# Patient Record
Sex: MALE | Race: WHITE | NOT HISPANIC OR LATINO | Employment: FULL TIME | URBAN - METROPOLITAN AREA
[De-identification: names, ages, dates, MRNs, and addresses within clinical notes are randomized per-mention and may not be internally consistent; named-entity substitution may affect disease eponyms.]

---

## 2017-03-30 ENCOUNTER — ALLSCRIPTS OFFICE VISIT (OUTPATIENT)
Dept: OTHER | Facility: OTHER | Age: 57
End: 2017-03-30

## 2017-03-30 DIAGNOSIS — M54.50 LOW BACK PAIN: ICD-10-CM

## 2017-03-30 DIAGNOSIS — E11.9 TYPE 2 DIABETES MELLITUS WITHOUT COMPLICATIONS (HCC): ICD-10-CM

## 2017-03-30 DIAGNOSIS — R20.2 PARESTHESIA OF SKIN: ICD-10-CM

## 2017-03-30 DIAGNOSIS — E78.5 HYPERLIPIDEMIA: ICD-10-CM

## 2017-03-30 DIAGNOSIS — M54.6 PAIN IN THORACIC SPINE: ICD-10-CM

## 2017-03-30 DIAGNOSIS — R20.0 ANESTHESIA OF SKIN: ICD-10-CM

## 2017-03-30 DIAGNOSIS — E78.1 PURE HYPERGLYCERIDEMIA: ICD-10-CM

## 2017-03-30 DIAGNOSIS — M25.50 PAIN IN JOINT: ICD-10-CM

## 2017-03-30 DIAGNOSIS — M54.2 CERVICALGIA: ICD-10-CM

## 2017-03-30 DIAGNOSIS — E55.9 VITAMIN D DEFICIENCY: ICD-10-CM

## 2017-04-04 ENCOUNTER — GENERIC CONVERSION - ENCOUNTER (OUTPATIENT)
Dept: OTHER | Facility: OTHER | Age: 57
End: 2017-04-04

## 2017-04-04 ENCOUNTER — HOSPITAL ENCOUNTER (OUTPATIENT)
Dept: RADIOLOGY | Facility: HOSPITAL | Age: 57
Discharge: HOME | End: 2017-04-04
Payer: COMMERCIAL

## 2017-04-04 ENCOUNTER — APPOINTMENT (OUTPATIENT)
Dept: LAB | Facility: HOSPITAL | Age: 57
End: 2017-04-04
Payer: COMMERCIAL

## 2017-04-04 DIAGNOSIS — M54.50 LOW BACK PAIN: ICD-10-CM

## 2017-04-04 DIAGNOSIS — R20.0 ANESTHESIA OF SKIN: ICD-10-CM

## 2017-04-04 DIAGNOSIS — E78.1 PURE HYPERGLYCERIDEMIA: ICD-10-CM

## 2017-04-04 DIAGNOSIS — E55.9 VITAMIN D DEFICIENCY: ICD-10-CM

## 2017-04-04 DIAGNOSIS — R20.2 PARESTHESIA OF SKIN: ICD-10-CM

## 2017-04-04 DIAGNOSIS — M54.6 PAIN IN THORACIC SPINE: ICD-10-CM

## 2017-04-04 DIAGNOSIS — M25.50 PAIN IN JOINT: ICD-10-CM

## 2017-04-04 DIAGNOSIS — M54.2 CERVICALGIA: ICD-10-CM

## 2017-04-04 LAB
25(OH)D3 SERPL-MCNC: 20.4 NG/ML (ref 30–100)
ALBUMIN SERPL BCP-MCNC: 4.1 G/DL (ref 3.5–5)
ALP SERPL-CCNC: 57 U/L (ref 46–116)
ALT SERPL W P-5'-P-CCNC: 43 U/L (ref 12–78)
ANION GAP SERPL CALCULATED.3IONS-SCNC: 13 MMOL/L (ref 4–13)
AST SERPL W P-5'-P-CCNC: 15 U/L (ref 5–45)
BASOPHILS # BLD AUTO: 0.02 THOUSANDS/ΜL (ref 0–0.1)
BASOPHILS NFR BLD AUTO: 0 % (ref 0–1)
BILIRUB SERPL-MCNC: 0.47 MG/DL (ref 0.2–1)
BUN SERPL-MCNC: 20 MG/DL (ref 5–25)
CALCIUM SERPL-MCNC: 9.4 MG/DL (ref 8.3–10.1)
CHLORIDE SERPL-SCNC: 102 MMOL/L (ref 100–108)
CHOLEST SERPL-MCNC: 262 MG/DL (ref 50–200)
CO2 SERPL-SCNC: 26 MMOL/L (ref 21–32)
CREAT SERPL-MCNC: 1.28 MG/DL (ref 0.6–1.3)
EOSINOPHIL # BLD AUTO: 0.04 THOUSAND/ΜL (ref 0–0.61)
EOSINOPHIL NFR BLD AUTO: 0 % (ref 0–6)
ERYTHROCYTE [DISTWIDTH] IN BLOOD BY AUTOMATED COUNT: 11.7 % (ref 11.6–15.1)
ERYTHROCYTE [SEDIMENTATION RATE] IN BLOOD: 5 MM/HOUR (ref 0–10)
EST. AVERAGE GLUCOSE BLD GHB EST-MCNC: 143 MG/DL
GFR SERPL CREATININE-BSD FRML MDRD: 58.6 ML/MIN/1.73SQ M
GLUCOSE P FAST SERPL-MCNC: 123 MG/DL (ref 65–99)
HBA1C MFR BLD: 6.6 % (ref 4.2–6.3)
HCT VFR BLD AUTO: 45.7 % (ref 36.5–49.3)
HDLC SERPL-MCNC: 45 MG/DL (ref 40–60)
HGB BLD-MCNC: 16.3 G/DL (ref 12–17)
LDLC SERPL CALC-MCNC: 171 MG/DL (ref 0–100)
LYMPHOCYTES # BLD AUTO: 2.36 THOUSANDS/ΜL (ref 0.6–4.47)
LYMPHOCYTES NFR BLD AUTO: 21 % (ref 14–44)
MCH RBC QN AUTO: 31 PG (ref 26.8–34.3)
MCHC RBC AUTO-ENTMCNC: 35.7 G/DL (ref 31.4–37.4)
MCV RBC AUTO: 87 FL (ref 82–98)
MONOCYTES # BLD AUTO: 0.69 THOUSAND/ΜL (ref 0.17–1.22)
MONOCYTES NFR BLD AUTO: 6 % (ref 4–12)
NEUTROPHILS # BLD AUTO: 8.21 THOUSANDS/ΜL (ref 1.85–7.62)
NEUTS SEG NFR BLD AUTO: 73 % (ref 43–75)
PLATELET # BLD AUTO: 316 THOUSANDS/UL (ref 149–390)
PMV BLD AUTO: 10.8 FL (ref 8.9–12.7)
POTASSIUM SERPL-SCNC: 4 MMOL/L (ref 3.5–5.3)
PROT SERPL-MCNC: 7.6 G/DL (ref 6.4–8.2)
PSA SERPL-MCNC: 4 NG/ML (ref 0–4)
RBC # BLD AUTO: 5.25 MILLION/UL (ref 3.88–5.62)
SODIUM SERPL-SCNC: 141 MMOL/L (ref 136–145)
T4 FREE SERPL-MCNC: 0.99 NG/DL (ref 0.76–1.46)
TRIGL SERPL-MCNC: 228 MG/DL
TSH SERPL DL<=0.05 MIU/L-ACNC: 1.15 UIU/ML (ref 0.36–3.74)
WBC # BLD AUTO: 11.32 THOUSAND/UL (ref 4.31–10.16)

## 2017-04-04 PROCEDURE — 86430 RHEUMATOID FACTOR TEST QUAL: CPT

## 2017-04-04 PROCEDURE — 85025 COMPLETE CBC W/AUTO DIFF WBC: CPT

## 2017-04-04 PROCEDURE — 72072 X-RAY EXAM THORAC SPINE 3VWS: CPT

## 2017-04-04 PROCEDURE — 86038 ANTINUCLEAR ANTIBODIES: CPT

## 2017-04-04 PROCEDURE — 84439 ASSAY OF FREE THYROXINE: CPT

## 2017-04-04 PROCEDURE — 36415 COLL VENOUS BLD VENIPUNCTURE: CPT

## 2017-04-04 PROCEDURE — 85652 RBC SED RATE AUTOMATED: CPT

## 2017-04-04 PROCEDURE — 84443 ASSAY THYROID STIM HORMONE: CPT

## 2017-04-04 PROCEDURE — 72110 X-RAY EXAM L-2 SPINE 4/>VWS: CPT

## 2017-04-04 PROCEDURE — 86618 LYME DISEASE ANTIBODY: CPT

## 2017-04-04 PROCEDURE — 83036 HEMOGLOBIN GLYCOSYLATED A1C: CPT

## 2017-04-04 PROCEDURE — 82306 VITAMIN D 25 HYDROXY: CPT

## 2017-04-04 PROCEDURE — 72050 X-RAY EXAM NECK SPINE 4/5VWS: CPT

## 2017-04-04 PROCEDURE — 80061 LIPID PANEL: CPT

## 2017-04-04 PROCEDURE — 80053 COMPREHEN METABOLIC PANEL: CPT

## 2017-04-04 PROCEDURE — G0103 PSA SCREENING: HCPCS

## 2017-04-05 ENCOUNTER — GENERIC CONVERSION - ENCOUNTER (OUTPATIENT)
Dept: OTHER | Facility: OTHER | Age: 57
End: 2017-04-05

## 2017-04-05 LAB
B BURGDOR IGG SER IA-ACNC: 0.04
B BURGDOR IGM SER IA-ACNC: 0.2
RHEUMATOID FACT SER QL LA: NEGATIVE
RYE IGE QN: NEGATIVE

## 2017-04-12 ENCOUNTER — HOSPITAL ENCOUNTER (OUTPATIENT)
Dept: NON INVASIVE DIAGNOSTICS | Facility: CLINIC | Age: 57
Discharge: HOME | End: 2017-04-12
Payer: COMMERCIAL

## 2017-04-12 DIAGNOSIS — R20.0 ANESTHESIA OF SKIN: ICD-10-CM

## 2017-04-12 DIAGNOSIS — R20.2 PARESTHESIA OF SKIN: ICD-10-CM

## 2017-04-12 PROCEDURE — 93923 UPR/LXTR ART STDY 3+ LVLS: CPT

## 2017-04-17 ENCOUNTER — ALLSCRIPTS OFFICE VISIT (OUTPATIENT)
Dept: OTHER | Facility: OTHER | Age: 57
End: 2017-04-17

## 2017-04-19 ENCOUNTER — GENERIC CONVERSION - ENCOUNTER (OUTPATIENT)
Dept: OTHER | Facility: OTHER | Age: 57
End: 2017-04-19

## 2017-07-10 ENCOUNTER — GENERIC CONVERSION - ENCOUNTER (OUTPATIENT)
Dept: OTHER | Facility: OTHER | Age: 57
End: 2017-07-10

## 2017-07-10 ENCOUNTER — APPOINTMENT (OUTPATIENT)
Dept: LAB | Facility: HOSPITAL | Age: 57
End: 2017-07-10
Payer: COMMERCIAL

## 2017-07-10 DIAGNOSIS — M25.50 PAIN IN JOINT: ICD-10-CM

## 2017-07-10 DIAGNOSIS — E11.9 TYPE 2 DIABETES MELLITUS WITHOUT COMPLICATIONS (HCC): ICD-10-CM

## 2017-07-10 DIAGNOSIS — E55.9 VITAMIN D DEFICIENCY: ICD-10-CM

## 2017-07-10 DIAGNOSIS — R20.0 ANESTHESIA OF SKIN: ICD-10-CM

## 2017-07-10 DIAGNOSIS — E78.5 HYPERLIPIDEMIA: ICD-10-CM

## 2017-07-10 DIAGNOSIS — R20.2 PARESTHESIA OF SKIN: ICD-10-CM

## 2017-07-10 LAB
ALBUMIN SERPL BCP-MCNC: 4.2 G/DL (ref 3.5–5)
ALP SERPL-CCNC: 48 U/L (ref 46–116)
ALT SERPL W P-5'-P-CCNC: 28 U/L (ref 12–78)
ANION GAP SERPL CALCULATED.3IONS-SCNC: 7 MMOL/L (ref 4–13)
AST SERPL W P-5'-P-CCNC: 17 U/L (ref 5–45)
BILIRUB SERPL-MCNC: 0.31 MG/DL (ref 0.2–1)
BUN SERPL-MCNC: 22 MG/DL (ref 5–25)
CALCIUM SERPL-MCNC: 9.4 MG/DL (ref 8.3–10.1)
CHLORIDE SERPL-SCNC: 104 MMOL/L (ref 100–108)
CO2 SERPL-SCNC: 28 MMOL/L (ref 21–32)
CREAT SERPL-MCNC: 1.23 MG/DL (ref 0.6–1.3)
CREAT UR-MCNC: 90.9 MG/DL
EST. AVERAGE GLUCOSE BLD GHB EST-MCNC: 123 MG/DL
GFR SERPL CREATININE-BSD FRML MDRD: >60 ML/MIN/1.73SQ M
GLUCOSE P FAST SERPL-MCNC: 114 MG/DL (ref 65–99)
HBA1C MFR BLD: 5.9 % (ref 4.2–6.3)
MICROALBUMIN UR-MCNC: 5.5 MG/L (ref 0–20)
MICROALBUMIN/CREAT 24H UR: 6 MG/G CREATININE (ref 0–30)
POTASSIUM SERPL-SCNC: 4.7 MMOL/L (ref 3.5–5.3)
PROT SERPL-MCNC: 7.3 G/DL (ref 6.4–8.2)
SODIUM SERPL-SCNC: 139 MMOL/L (ref 136–145)

## 2017-07-10 PROCEDURE — 82570 ASSAY OF URINE CREATININE: CPT

## 2017-07-10 PROCEDURE — 83036 HEMOGLOBIN GLYCOSYLATED A1C: CPT

## 2017-07-10 PROCEDURE — 36415 COLL VENOUS BLD VENIPUNCTURE: CPT

## 2017-07-10 PROCEDURE — 82043 UR ALBUMIN QUANTITATIVE: CPT

## 2017-07-10 PROCEDURE — 80053 COMPREHEN METABOLIC PANEL: CPT

## 2017-07-17 ENCOUNTER — ALLSCRIPTS OFFICE VISIT (OUTPATIENT)
Dept: OTHER | Facility: OTHER | Age: 57
End: 2017-07-17

## 2017-07-17 DIAGNOSIS — K21.9 GASTRO-ESOPHAGEAL REFLUX DISEASE WITHOUT ESOPHAGITIS: ICD-10-CM

## 2017-07-17 DIAGNOSIS — E78.5 HYPERLIPIDEMIA: ICD-10-CM

## 2017-07-17 DIAGNOSIS — M54.50 LOW BACK PAIN: ICD-10-CM

## 2017-07-17 DIAGNOSIS — E55.9 VITAMIN D DEFICIENCY: ICD-10-CM

## 2017-07-17 DIAGNOSIS — E11.9 TYPE 2 DIABETES MELLITUS WITHOUT COMPLICATIONS (HCC): ICD-10-CM

## 2017-07-17 DIAGNOSIS — M54.2 CERVICALGIA: ICD-10-CM

## 2017-07-25 ENCOUNTER — GENERIC CONVERSION - ENCOUNTER (OUTPATIENT)
Dept: OTHER | Facility: OTHER | Age: 57
End: 2017-07-25

## 2017-07-25 ENCOUNTER — APPOINTMENT (OUTPATIENT)
Dept: PHYSICAL THERAPY | Facility: MEDICAL CENTER | Age: 57
End: 2017-07-25
Payer: COMMERCIAL

## 2017-07-25 DIAGNOSIS — M54.2 CERVICALGIA: ICD-10-CM

## 2017-07-25 DIAGNOSIS — M54.50 LOW BACK PAIN: ICD-10-CM

## 2017-07-25 PROCEDURE — G8990 OTHER PT/OT CURRENT STATUS: HCPCS | Performed by: PHYSICAL THERAPIST

## 2017-07-25 PROCEDURE — 97163 PT EVAL HIGH COMPLEX 45 MIN: CPT

## 2017-07-25 PROCEDURE — 97112 NEUROMUSCULAR REEDUCATION: CPT

## 2017-07-25 PROCEDURE — G8991 OTHER PT/OT GOAL STATUS: HCPCS | Performed by: PHYSICAL THERAPIST

## 2017-08-01 ENCOUNTER — APPOINTMENT (OUTPATIENT)
Dept: PHYSICAL THERAPY | Facility: MEDICAL CENTER | Age: 57
End: 2017-08-01
Payer: COMMERCIAL

## 2017-08-01 PROCEDURE — 97140 MANUAL THERAPY 1/> REGIONS: CPT

## 2017-08-01 PROCEDURE — 97112 NEUROMUSCULAR REEDUCATION: CPT

## 2017-08-01 PROCEDURE — 97110 THERAPEUTIC EXERCISES: CPT

## 2017-08-03 ENCOUNTER — APPOINTMENT (OUTPATIENT)
Dept: PHYSICAL THERAPY | Facility: MEDICAL CENTER | Age: 57
End: 2017-08-03
Payer: COMMERCIAL

## 2017-08-03 PROCEDURE — 97110 THERAPEUTIC EXERCISES: CPT

## 2017-08-03 PROCEDURE — 97140 MANUAL THERAPY 1/> REGIONS: CPT

## 2017-08-08 ENCOUNTER — APPOINTMENT (OUTPATIENT)
Dept: PHYSICAL THERAPY | Facility: MEDICAL CENTER | Age: 57
End: 2017-08-08
Payer: COMMERCIAL

## 2017-08-10 ENCOUNTER — APPOINTMENT (OUTPATIENT)
Dept: PHYSICAL THERAPY | Facility: MEDICAL CENTER | Age: 57
End: 2017-08-10
Payer: COMMERCIAL

## 2017-08-14 ENCOUNTER — GENERIC CONVERSION - ENCOUNTER (OUTPATIENT)
Dept: OTHER | Facility: OTHER | Age: 57
End: 2017-08-14

## 2017-08-14 LAB
LEFT EYE DIABETIC RETINOPATHY: NORMAL
RIGHT EYE DIABETIC RETINOPATHY: NORMAL

## 2017-08-15 ENCOUNTER — APPOINTMENT (OUTPATIENT)
Dept: PHYSICAL THERAPY | Facility: MEDICAL CENTER | Age: 57
End: 2017-08-15
Payer: COMMERCIAL

## 2017-08-28 ENCOUNTER — APPOINTMENT (OUTPATIENT)
Dept: PHYSICAL THERAPY | Facility: MEDICAL CENTER | Age: 57
End: 2017-08-28
Payer: COMMERCIAL

## 2017-10-16 ENCOUNTER — APPOINTMENT (OUTPATIENT)
Dept: LAB | Facility: HOSPITAL | Age: 57
End: 2017-10-16
Payer: COMMERCIAL

## 2017-10-16 DIAGNOSIS — E11.9 TYPE 2 DIABETES MELLITUS WITHOUT COMPLICATIONS (HCC): ICD-10-CM

## 2017-10-16 DIAGNOSIS — E55.9 VITAMIN D DEFICIENCY: ICD-10-CM

## 2017-10-16 DIAGNOSIS — E78.5 HYPERLIPIDEMIA: ICD-10-CM

## 2017-10-16 DIAGNOSIS — K21.9 GASTRO-ESOPHAGEAL REFLUX DISEASE WITHOUT ESOPHAGITIS: ICD-10-CM

## 2017-10-16 DIAGNOSIS — M54.50 LOW BACK PAIN: ICD-10-CM

## 2017-10-16 DIAGNOSIS — M54.2 CERVICALGIA: ICD-10-CM

## 2017-10-16 LAB
25(OH)D3 SERPL-MCNC: 24.3 NG/ML (ref 30–100)
ALBUMIN SERPL BCP-MCNC: 3.8 G/DL (ref 3.5–5)
ALP SERPL-CCNC: 47 U/L (ref 46–116)
ALT SERPL W P-5'-P-CCNC: 27 U/L (ref 12–78)
ANION GAP SERPL CALCULATED.3IONS-SCNC: 7 MMOL/L (ref 4–13)
AST SERPL W P-5'-P-CCNC: 14 U/L (ref 5–45)
BILIRUB SERPL-MCNC: 0.27 MG/DL (ref 0.2–1)
BUN SERPL-MCNC: 16 MG/DL (ref 5–25)
CALCIUM SERPL-MCNC: 9.5 MG/DL (ref 8.3–10.1)
CHLORIDE SERPL-SCNC: 104 MMOL/L (ref 100–108)
CHOLEST SERPL-MCNC: 210 MG/DL (ref 50–200)
CO2 SERPL-SCNC: 28 MMOL/L (ref 21–32)
CREAT SERPL-MCNC: 1.15 MG/DL (ref 0.6–1.3)
EST. AVERAGE GLUCOSE BLD GHB EST-MCNC: 128 MG/DL
GFR SERPL CREATININE-BSD FRML MDRD: 71 ML/MIN/1.73SQ M
GLUCOSE P FAST SERPL-MCNC: 120 MG/DL (ref 65–99)
HBA1C MFR BLD: 6.1 % (ref 4.2–6.3)
HDLC SERPL-MCNC: 39 MG/DL (ref 40–60)
LDLC SERPL CALC-MCNC: 123 MG/DL (ref 0–100)
POTASSIUM SERPL-SCNC: 5.1 MMOL/L (ref 3.5–5.3)
PROT SERPL-MCNC: 6.8 G/DL (ref 6.4–8.2)
SODIUM SERPL-SCNC: 139 MMOL/L (ref 136–145)
TRIGL SERPL-MCNC: 241 MG/DL

## 2017-10-16 PROCEDURE — 82306 VITAMIN D 25 HYDROXY: CPT

## 2017-10-16 PROCEDURE — 80053 COMPREHEN METABOLIC PANEL: CPT

## 2017-10-16 PROCEDURE — 36415 COLL VENOUS BLD VENIPUNCTURE: CPT

## 2017-10-16 PROCEDURE — 80061 LIPID PANEL: CPT

## 2017-10-16 PROCEDURE — 83036 HEMOGLOBIN GLYCOSYLATED A1C: CPT

## 2017-10-23 ENCOUNTER — ALLSCRIPTS OFFICE VISIT (OUTPATIENT)
Dept: OTHER | Facility: OTHER | Age: 57
End: 2017-10-23

## 2017-10-23 DIAGNOSIS — E55.9 VITAMIN D DEFICIENCY: ICD-10-CM

## 2017-10-23 DIAGNOSIS — K21.9 GASTRO-ESOPHAGEAL REFLUX DISEASE WITHOUT ESOPHAGITIS: ICD-10-CM

## 2017-10-23 DIAGNOSIS — E78.5 HYPERLIPIDEMIA: ICD-10-CM

## 2017-10-23 DIAGNOSIS — E11.9 TYPE 2 DIABETES MELLITUS WITHOUT COMPLICATIONS (HCC): ICD-10-CM

## 2017-10-24 NOTE — PROGRESS NOTES
Assessment  1  Hyperlipidemia (272 4) (E78 5)   2  Diabetes mellitus type II, controlled (250 00) (E11 9)   3  Vitamin D deficiency (268 9) (E55 9)   4  Esophageal reflux (530 81) (K21 9)    Plan  Diabetes mellitus type II, controlled, Esophageal reflux, Hyperlipidemia, Vitamin D  deficiency    · (1) COMPREHENSIVE METABOLIC PANEL; Status:Active; Requested VIB:[id number];    · (1) HEMOGLOBIN A1C; Status:Active; Requested AMR:[id number];    · (1) VITAMIN D 25-HYDROXY; Status:Active; Requested FBJ:[id number];    · Follow-up visit in 3 months Evaluation and Treatment  Follow-up  Status: Hold For -  Scheduling  Requested for: [id number]    Discussion/Summary    Here for f-up and review labs  Trial off Metformin since HGA1C at 5 9 in past but is increasing to 6 1 and is on a good low sugar diet and will recheck in 3 months with HGA1C and cmp and Vitamin D labs  Takes Omeprazole 20 mg daily prn gerd elevate head of bed and then use prn omeprazole  Call if gerd continues and will consult GI  Low sugar and low cholesterol diet and exercise encouraged  Take Vitamin D as directed  Rec  cholesterol med if LDL not better  Here with wife  Refuses flu shot  Recheck 3 months with labs  The patient was counseled regarding  The treatment plan was reviewed with the patient/guardian  The patient/guardian understands and agrees with the treatment plan      Chief Complaint  Pt here for a 3 month f/u for diabetes and hyperlipidemia  No other concerns  Patient is here today for follow up of chronic conditions described in HPI  History of Present Illness  Pt here for a 3 month f/u for diabetes and hyperlipidemia  No other concerns  Has Ramonia Leisure which is stable on omeprazole  He is here with wife  He is not compliant with taking Vitamin D3 as directed  No cp or sob, or ha  Here to review labs  He is not on Metformin and is on diet control for diabetes        Review of Systems    Constitutional: No fever or chills, feels well, no tiredness, no recent weight gain or weight loss  Eyes: No complaints of eye pain, no red eyes, no discharge from eyes, no itchy eyes  ENT: no complaints of earache, no hearing loss, no nosebleeds, no nasal discharge, no sore throat, no hoarseness  Cardiovascular: No complaints of slow heart rate, no fast heart rate, no chest pain, no palpitations, no leg claudication, no lower extremity  Respiratory: No complaints of shortness of breath, no wheezing, no cough, no SOB on exertion, no orthopnea or PND  Gastrointestinal: as noted in HPI  Genitourinary: No complaints of dysuria, no incontinence, no hesitancy, no nocturia, no genital lesion, no testicular pain  Musculoskeletal: No complaints of arthralgia, no myalgias, no joint swelling or stiffness, no limb pain or swelling  Integumentary: No complaints of skin rash or skin lesions, no itching, no skin wound, no dry skin  Neurological: No compliants of headache, no confusion, no convulsions, no numbness or tingling, no dizziness or fainting, no limb weakness, no difficulty walking  Psychiatric: Is not suicidal, no sleep disturbances, no anxiety or depression, no change in personality, no emotional problems  Endocrine: as noted in HPI  Hematologic/Lymphatic: No complaints of swollen glands, no swollen glands in the neck, does not bleed easily, no easy bruising  Active Problems  1  Anxiety (300 00) (F41 9)   2  Arthralgia (719 40) (M25 50)   3  Celiac crisis (579 0) (K90 0)   4  Cervical pain (723 1) (M54 2)   5  Chronic low back pain (724 2,338 29) (M54 5,G89 29)   6  Diabetes mellitus type II, controlled (250 00) (E11 9)   7  Diverticulosis (562 10) (K57 90)   8  Esophageal reflux (530 81) (K21 9)   9  Hyperlipidemia (272 4) (E78 5)   10  Hyperlipoproteinemia type IV (272 1) (E78 1)   11  Influenza (487 1) (J11 1)   12  Numbness and tingling of both feet (782 0) (R20 0,R20 2)   13  Screening for colon cancer (V76 51) (Z12 11)   14   Testicular hypogonadism (257 2) (E29 1)   15  Thoracic back pain (724 1) (M54 6)   16  Vitamin D deficiency (268 9) (E55 9)    Past Medical History  1  History of Internal hemorrhoid, bleeding (455 2) (K64 8)    Surgical History  1  History of Anal Fistulotomy (Subcutaneous)   2  History of Complete Colonoscopy   3  History of Hernia Repair    Social History   · Denied: History of Alcohol Use (History)   · Current Some Day Smoker (305 1)   · Denied: History of Drug Use    Current Meds   1  Centrum Silver TABS; Therapy: (Recorded:44Uex4968) to Recorded   2  Co Q-10 CAPS; Therapy: (Lourena Nayla) to Recorded   3  Fish Oil 1000 MG Oral Capsule; Therapy: (Lourena Nayla) to Recorded   4  Magnesium 400 MG CAPS; Therapy: (Recorded:16Xrj3318) to Recorded   5  Meloxicam 15 MG Oral Tablet; TAKE 1 TABLET DAILY WITH FOOD; Therapy: (GMTIVONO:32GED1866) to Recorded   6  Omeprazole 20 MG Oral Capsule Delayed Release; take 1 capsule by mouth daily; Therapy: 25VIB2498 to (QFQJJZUF:41BDV8474)  Requested for: 44KOX2450; Last   Rx:26Rms5603 Ordered   7  Vitamin D3 2000 UNIT Oral Capsule; Therapy: (Recorded:28Qgm8892) to Recorded    Allergies  1  No Known Drug Allergies    Vitals  Vital Signs    Recorded: 48KIO0065 14:96QX   Systolic 124   Diastolic 82   Height 5 ft 11 in   Weight 175 lb 8 oz   BMI Calculated 24 48   BSA Calculated 1 99     Physical Exam    Constitutional   General appearance: No acute distress, well appearing and well nourished  Eyes   Conjunctiva and lids: No swelling, erythema, or discharge  Pupils and irises: Equal, round and reactive to light  Ears, Nose, Mouth, and Throat   External inspection of ears and nose: Normal     Otoscopic examination: Tympanic membrance translucent with normal light reflex  Canals patent without erythema  Nasal mucosa, septum, and turbinates: Normal without edema or erythema  Oropharynx: Normal with no erythema, edema, exudate or lesions      Pulmonary Respiratory effort: No increased work of breathing or signs of respiratory distress  Auscultation of lungs: Clear to auscultation, equal breath sounds bilaterally, no wheezes, no rales, no rhonci  Cardiovascular   Palpation of heart: Normal PMI, no thrills  Auscultation of heart: Normal rate and rhythm, normal S1 and S2, without murmurs  Examination of extremities for edema and/or varicosities: Normal     Carotid pulses: Normal     Lymphatic   Palpation of lymph nodes in neck: No lymphadenopathy  Musculoskeletal   Gait and station: Normal     Digits and nails: Normal without clubbing or cyanosis  Inspection/palpation of joints, bones, and muscles: Normal     Skin   Skin and subcutaneous tissue: Normal without rashes or lesions  Neurologic   Cranial nerves: Cranial nerves 2-12 intact  Reflexes: 2+ and symmetric  Sensation: No sensory loss  Psychiatric   Orientation to person, place and time: Normal     Mood and affect: Normal     Additional Exam:  positive distal pulses in lower extremeties, no edema  Diabetic Foot Screen: Normal        Health Management  Screening for colon cancer   COLONOSCOPY; every 5 years; Last 58YUP7700; Next Due: 24LTL9429;  Active    Future Appointments    Date/Time Provider Specialty Site   01/09/2018 09:00 AM Naima Cohen MD Neurology [place] [address]     Signatures   Electronically signed by : Oddis Simmonds, DO; Oct 23 2017 11:48AM EST                       (Author)

## 2017-10-25 ENCOUNTER — GENERIC CONVERSION - ENCOUNTER (OUTPATIENT)
Dept: OTHER | Facility: OTHER | Age: 57
End: 2017-10-25

## 2018-01-11 NOTE — RESULT NOTES
Message  Our records indicate that you are overdue for a diabetic eye exam  Please contact your eye doctor to schedule an appointment with their office  If you do not have an eye doctor, please contact our office and we will recommend a physician to you  We can be reached at [phone]   Thank you      Signatures   Electronically signed by : Veena He, ; Jul 10 2017 12:32PM EST                       (Author)

## 2018-01-12 VITALS
HEIGHT: 71 IN | BODY MASS INDEX: 24.57 KG/M2 | WEIGHT: 175.5 LBS | SYSTOLIC BLOOD PRESSURE: 124 MMHG | DIASTOLIC BLOOD PRESSURE: 82 MMHG

## 2018-01-13 VITALS
BODY MASS INDEX: 25.2 KG/M2 | DIASTOLIC BLOOD PRESSURE: 60 MMHG | SYSTOLIC BLOOD PRESSURE: 100 MMHG | WEIGHT: 180 LBS | HEIGHT: 71 IN

## 2018-01-14 VITALS
WEIGHT: 173.25 LBS | SYSTOLIC BLOOD PRESSURE: 108 MMHG | HEIGHT: 71 IN | DIASTOLIC BLOOD PRESSURE: 78 MMHG | BODY MASS INDEX: 24.25 KG/M2

## 2018-01-14 VITALS
WEIGHT: 183.13 LBS | SYSTOLIC BLOOD PRESSURE: 110 MMHG | BODY MASS INDEX: 25.64 KG/M2 | HEIGHT: 71 IN | DIASTOLIC BLOOD PRESSURE: 64 MMHG

## 2018-01-16 NOTE — RESULT NOTES
Message   lyme test wnl  Verified Results  (1) LYME ANTIBODY PROFILE [facility] TO [facility] [id number] 09:45AM Alan Beltre    Order Number: [id number]     Test Name Result Flag Reference   LYME IGG 0 04  0 00-0 79   NEGATIVE(0 00-0 79)-Absence of detectable Borrelia IgG Antibodies  A negative result does not exclude the possibility of Borrelia infection  If early Lyme disease is suspected,a second sample should be collected & tested 4 weeks after initial testing  LYME IGM 0 20  0 00-0 79   NEGATIVE (0 00-0 79)-Absence of detectable Borrelia IgM antibodies  A negative result does not exclude the possibility of Borrelia infection  If early lyme disease is suspected, a second sample should be collected & tested 4 weeks after initial testing

## 2018-01-16 NOTE — RESULT NOTES
Message   borderline elevated HGA1C and is now diabetic, high cholesterol low vitamin D  Rec  low sugar and low cholesterol diet and rec  office visit to discuss treatment options  Verified Results  (1) COMPREHENSIVE METABOLIC PANEL [id number] 40:92CC Oral Renetta   TW Order Number: [id number]     Test Name Result Flag Reference   SODIUM 141 mmol/L  136-145   POTASSIUM 4 0 mmol/L  3 5-5 3   CHLORIDE 102 mmol/L  100-108   CARBON DIOXIDE 26 mmol/L  21-32   ANION GAP (CALC) 13 mmol/L  4-13   BLOOD UREA NITROGEN 20 mg/dL  5-25   CREATININE 1 28 mg/dL  0 60-1 30   Standardized to IDMS reference method   CALCIUM 9 4 mg/dL  8 3-10 1   BILI, TOTAL 0 47 mg/dL  0 20-1 00   ALK PHOSPHATAS 57 U/L     ALT (SGPT) 43 U/L  12-78   AST(SGOT) 15 U/L  5-45   ALBUMIN 4 1 g/dL  3 5-5 0   TOTAL PROTEIN 7 6 g/dL  6 4-8 2   eGFR Non-African American 58 6 ml/min/1 73sq m     - Patient Instructions: This is a fasting blood test  Water, black tea or black coffee only after 9:00pm the night before test Drink 2 glasses of water the morning of test - Patient Instructions: This bloodwork is non-fasting  Please drink two glasses of   water morning of bloodwork  National Kidney Disease Education Program recommendations are as follows:  GFR calculation is accurate only with a steady state creatinine  Chronic Kidney disease less than 60 ml/min/1 73 sq  meters  Kidney failure less than 15 ml/min/1 73 sq  meters     GLUCOSE FASTING 123 mg/dL H 65-99     (1) CBC/PLT/DIFF 04Apr2017 09:45AM Oral Renetta   TW Order Number: [id number]     Test Name Result Flag Reference   WBC COUNT 11 32 Thousand/uL H 4 31-10 16   RBC COUNT 5 25 Million/uL  3 88-5 62   HEMOGLOBIN 16 3 g/dL  12 0-17 0   HEMATOCRIT 45 7 %  36 5-49 3   MCV 87 fL  82-98   MCH 31 0 pg  26 8-34 3   MCHC 35 7 g/dL  31 4-37 4   RDW 11 7 %  11 6-15 1   MPV 10 8 fL  8 9-12 7   PLATELET COUNT 571 Thousands/uL  149-390   NEUTROPHILS RELATIVE PERCENT 73 %  43-75 LYMPHOCYTES RELATIVE PERCENT 21 %  14-44   MONOCYTES RELATIVE PERCENT 6 %  4-12   EOSINOPHILS RELATIVE PERCENT 0 %  0-6   BASOPHILS RELATIVE PERCENT 0 %  0-1   NEUTROPHILS ABSOLUTE COUNT 8 21 Thousands/? ??L H 1 85-7 62   LYMPHOCYTES ABSOLUTE COUNT 2 36 Thousands/? ??L  0 60-4 47   MONOCYTES ABSOLUTE COUNT 0 69 Thousand/? ??L  0 17-1 22   EOSINOPHILS ABSOLUTE COUNT 0 04 Thousand/? ??L  0 00-0 61   BASOPHILS ABSOLUTE COUNT 0 02 Thousands/? ??L  0 00-0 10   - Patient Instructions: This bloodwork is non-fasting  Please drink two glasses of water morning of bloodwork  - Patient Instructions: This bloodwork is non-fasting  Please drink two glasses of water morning of bloodwork  (1) HEMOGLOBIN A1C 04Apr2017 09:45AM Helane Antis   TW Order Number: [id number]     Test Name Result Flag Reference   HEMOGLOBIN A1C 6 6 % H 4 2-6 3   EST  AVG  GLUCOSE 143 mg/dl       (1) LIPID PANEL FASTING W DIRECT LDL REFLEX 04Apr2017 09:45AM Helane Antis   TW Order Number: [id number]     Test Name Result Flag Reference   CHOLESTEROL 262 mg/dL H    LDL CHOLESTEROL CALCULATED 171 mg/dL H 0-100   - Patient Instructions: This is a fasting blood test  Water, black tea or black coffee only after 9:00pm the night before test   Drink 2 glasses of water the morning of test     - Patient Instructions: This is a fasting blood test  Water, black tea or black coffee only after 9:00pm the night before test Drink 2 glasses of water the morning of test - Patient Instructions: This bloodwork is non-fasting  Please drink two glasses of   water morning of bloodwork    Triglyceride:         Normal              <150 mg/dl       Borderline High    150-199 mg/dl       High               200-499 mg/dl       Very High          >499 mg/dl  Cholesterol:         Desirable        <200 mg/dl      Borderline High  200-239 mg/dl      High             >239 mg/dl  HDL Cholesterol:        High    >59 mg/dL      Low     <41 mg/dL  LDL Cholesterol:        Optimal          <100 mg/dl        Near Optimal     100-129 mg/dl        Above Optimal          Borderline High   130-159 mg/dl          High              160-189 mg/dl          Very High        >189 mg/dl  LDL CALCULATED:    This screening LDL is a calculated result  It does not have the accuracy of the Direct Measured LDL in the monitoring of patients with hyperlipidemia and/or statin therapy  Direct Measure LDL (OJK834) must be ordered separately in these patients  TRIGLYCERIDES 228 mg/dL H <=150   Specimen collection should occur prior to N-Acetylcysteine or Metamizole administration due to the potential for falsely depressed results  HDL,DIRECT 45 mg/dL  40-60   Specimen collection should occur prior to Metamizole administration due to the potential for falsely depressed results  (1) T4, FREE 04Apr2017 09:45AM Mary Bryan   TW Order Number: [id number]     Test Name Result Flag Reference   T4,FREE 0 99 ng/dL  0 76-1 46     (1) TSH [id number] 09:45AM Mary Bryan   TW Order Number: [id number]     Test Name Result Flag Reference   TSH 1 147 uIU/mL  0 358-3 740   - Patient Instructions: This bloodwork is non-fasting  Please drink two glasses of water morning of bloodwork  - Patient Instructions: This is a fasting blood test  Water, black tea or black coffee only after 9:00pm the night before test Drink 2 glasses of water the morning of test - Patient Instructions: This bloodwork is non-fasting  Please drink two glasses of   water morning of bloodwork  Patients undergoing fluorescein dye angiography may retain small amounts of fluorescein in the body for 48-72 hours post procedure  Samples containing fluorescein can produce falsely depressed TSH values  If the patient had this procedure,a specimen should be resubmitted post fluorescein clearance       (1) VITAMIN D 25-HYDROXY 04Apr2017 09:45AM Mary Bryan   TW Order Number: [id number] Test Name Result Flag Reference   VIT D 25-HYDROX 20 4 ng/mL L 30 0-100 0   This assay is a certified procedure of the CDC Vitamin D Standardization Certification Program (VDSCP)     Deficiency <20ng/ml   Insufficiency 20-30ng/ml   Sufficient  ng/ml     *Patients undergoing fluorescein dye angiography may retain small amounts of fluorescein in the body for 48-72 hours post procedure  Samples containing fluorescein can produce falsely elevated Vitamin D values  If the patient had this procedure, a specimen should be resubmitted post fluorescein clearance  (1) PSA (SCREEN) (Dx V76 44 Screen for Prostate Cancer) [id number] 09:45AM Mony Lizarraga Order Number: [id number]     Test Name Result Flag Reference   PROSTATE SPECIFIC ANTIGEN 4 0 ng/mL  0 0-4 0   American Urological Association Guidelines define biochemical recurrence of prostate cancer as a detectable or rising PSA value post-radical prostatectomy that is greater than or equal to 0 2 ng/mL with a second confirmatory level of greater than or equal to 0 2 ng/mL  - Patient Instructions: This test is non-fasting  Please drink two glasses of water morning of bloodwork  - Patient Instructions: This test is non-fasting  Please drink two glasses of water morning of bloodwork       (1) SED RATE [id number] 09:45AM Wilian TOURE Order Number: [id number]     Test Name Result Flag Reference   SED RATE 5 mm/hour  0-10
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
## 2018-01-22 ENCOUNTER — TRANSCRIBE ORDERS (OUTPATIENT)
Dept: ADMINISTRATIVE | Facility: HOSPITAL | Age: 58
End: 2018-01-22

## 2018-01-22 ENCOUNTER — APPOINTMENT (OUTPATIENT)
Dept: LAB | Facility: HOSPITAL | Age: 58
End: 2018-01-22
Payer: COMMERCIAL

## 2018-01-22 DIAGNOSIS — E55.9 VITAMIN D DEFICIENCY: ICD-10-CM

## 2018-01-22 DIAGNOSIS — M54.2 CERVICALGIA: Primary | ICD-10-CM

## 2018-01-22 DIAGNOSIS — E11.9 TYPE 2 DIABETES MELLITUS WITHOUT COMPLICATIONS (HCC): ICD-10-CM

## 2018-01-22 DIAGNOSIS — K21.9 GASTRO-ESOPHAGEAL REFLUX DISEASE WITHOUT ESOPHAGITIS: ICD-10-CM

## 2018-01-22 DIAGNOSIS — M54.2 CERVICALGIA: ICD-10-CM

## 2018-01-22 DIAGNOSIS — E78.5 HYPERLIPIDEMIA: ICD-10-CM

## 2018-01-22 LAB
25(OH)D3 SERPL-MCNC: 25 NG/ML (ref 30–100)
ALBUMIN SERPL BCP-MCNC: 3.8 G/DL (ref 3.5–5)
ALP SERPL-CCNC: 46 U/L (ref 46–116)
ALT SERPL W P-5'-P-CCNC: 28 U/L (ref 12–78)
ANION GAP SERPL CALCULATED.3IONS-SCNC: 7 MMOL/L (ref 4–13)
AST SERPL W P-5'-P-CCNC: 15 U/L (ref 5–45)
BILIRUB SERPL-MCNC: 0.28 MG/DL (ref 0.2–1)
BUN SERPL-MCNC: 15 MG/DL (ref 5–25)
CALCIUM SERPL-MCNC: 8.9 MG/DL (ref 8.3–10.1)
CHLORIDE SERPL-SCNC: 107 MMOL/L (ref 100–108)
CO2 SERPL-SCNC: 28 MMOL/L (ref 21–32)
CREAT SERPL-MCNC: 1.19 MG/DL (ref 0.6–1.3)
EST. AVERAGE GLUCOSE BLD GHB EST-MCNC: 134 MG/DL
GFR SERPL CREATININE-BSD FRML MDRD: 68 ML/MIN/1.73SQ M
GLUCOSE P FAST SERPL-MCNC: 115 MG/DL (ref 65–99)
HBA1C MFR BLD: 6.3 % (ref 4.2–6.3)
POTASSIUM SERPL-SCNC: 4.5 MMOL/L (ref 3.5–5.3)
PROT SERPL-MCNC: 6.9 G/DL (ref 6.4–8.2)
SODIUM SERPL-SCNC: 142 MMOL/L (ref 136–145)

## 2018-01-22 PROCEDURE — 81374 HLA I TYPING 1 ANTIGEN LR: CPT

## 2018-01-22 PROCEDURE — 80053 COMPREHEN METABOLIC PANEL: CPT

## 2018-01-22 PROCEDURE — 36415 COLL VENOUS BLD VENIPUNCTURE: CPT

## 2018-01-22 PROCEDURE — 83036 HEMOGLOBIN GLYCOSYLATED A1C: CPT

## 2018-01-22 PROCEDURE — 82306 VITAMIN D 25 HYDROXY: CPT

## 2018-01-23 ENCOUNTER — GENERIC CONVERSION - ENCOUNTER (OUTPATIENT)
Dept: OTHER | Facility: OTHER | Age: 58
End: 2018-01-23

## 2018-01-24 NOTE — RESULT NOTES
Verified Results  (1) COMPREHENSIVE METABOLIC PANEL [id number] 35:31AY [facility] Order Number: [id number]     Test Name Result Flag Reference   SODIUM 142 mmol/L  136-145   POTASSIUM 4 5 mmol/L  3 5-5 3   CHLORIDE 107 mmol/L  100-108   CARBON DIOXIDE 28 mmol/L  21-32   ANION GAP (CALC) 7 mmol/L  4-13   BLOOD UREA NITROGEN 15 mg/dL  5-25   CREATININE 1 19 mg/dL  0 60-1 30   Standardized to IDMS reference method   CALCIUM 8 9 mg/dL  8 3-10 1   BILI, TOTAL 0 28 mg/dL  0 20-1 00   ALK PHOSPHATAS 46 U/L     ALT (SGPT) 28 U/L  12-78   Specimen collection should occur prior to Sulfasalazine administration due to the potential for falsely depressed results  AST(SGOT) 15 U/L  5-45   Specimen collection should occur prior to Sulfasalazine administration due to the potential for falsely depressed results  ALBUMIN 3 8 g/dL  3 5-5 0   TOTAL PROTEIN 6 9 g/dL  6 4-8 2   eGFR 68 ml/min/1 73sq m     National Kidney Disease Education Program recommendations are as follows:  GFR calculation is accurate only with a steady state creatinine  Chronic Kidney disease less than 60 ml/min/1 73 sq  meters  Kidney failure less than 15 ml/min/1 73 sq  meters  GLUCOSE FASTING 115 mg/dL H 65-99   Specimen collection should occur prior to Sulfasalazine administration due to the potential for falsely depressed results  Specimen collection should occur prior to Sulfapyridine administration due to the potential for falsely elevated results  (1) HEMOGLOBIN A1C 22Jan2018 11:14AM [facility] Order Number: [id number]     Test Name Result Flag Reference   HEMOGLOBIN A1C 6 3 %  4 2-6 3   EST  AVG   GLUCOSE 134 mg/dl       (1) VITAMIN D 25-HYDROXY 22Jan2018 11:14AM [facility] Order Number: [id number]     Test Name Result Flag Reference   VIT D 25-HYDROX 25 0 ng/mL L 30 0-100 0   This assay is a certified procedure of the CDC Vitamin D Standardization Certification Program (VDSCP)     Deficiency <20ng/ml   Insufficiency 20-30ng/ml   Sufficient  ng/ml     *Patients undergoing fluorescein dye angiography may retain small amounts of fluorescein in the body for 48-72 hours post procedure  Samples containing fluorescein can produce falsely elevated Vitamin D values  If the patient had this procedure, a specimen should be resubmitted post fluorescein clearance

## 2018-01-25 RX ORDER — OMEPRAZOLE 20 MG/1
1 CAPSULE, DELAYED RELEASE ORAL DAILY
COMMUNITY
Start: 2017-07-17

## 2018-01-25 RX ORDER — DIMENHYDRINATE 50 MG
TABLET ORAL
COMMUNITY

## 2018-01-25 RX ORDER — ACETAMINOPHEN 160 MG
1000 TABLET,DISINTEGRATING ORAL DAILY
COMMUNITY

## 2018-01-25 RX ORDER — MELOXICAM 15 MG/1
1 TABLET ORAL DAILY
COMMUNITY

## 2018-01-25 RX ORDER — MULTIVIT WITH MINERALS/LUTEIN
TABLET ORAL
COMMUNITY
End: 2019-10-16 | Stop reason: ALTCHOICE

## 2018-01-25 RX ORDER — CHLORAL HYDRATE 500 MG
CAPSULE ORAL
COMMUNITY

## 2018-01-29 ENCOUNTER — OFFICE VISIT (OUTPATIENT)
Dept: FAMILY MEDICINE CLINIC | Facility: CLINIC | Age: 58
End: 2018-01-29
Payer: COMMERCIAL

## 2018-01-29 VITALS
WEIGHT: 177.2 LBS | HEIGHT: 69 IN | DIASTOLIC BLOOD PRESSURE: 80 MMHG | SYSTOLIC BLOOD PRESSURE: 122 MMHG | BODY MASS INDEX: 26.25 KG/M2

## 2018-01-29 DIAGNOSIS — K21.9 GASTROESOPHAGEAL REFLUX DISEASE WITHOUT ESOPHAGITIS: ICD-10-CM

## 2018-01-29 DIAGNOSIS — R73.03 PREDIABETES: Primary | ICD-10-CM

## 2018-01-29 DIAGNOSIS — E66.3 OVERWEIGHT (BMI 25.0-29.9): ICD-10-CM

## 2018-01-29 DIAGNOSIS — E55.9 VITAMIN D DEFICIENCY: ICD-10-CM

## 2018-01-29 LAB — HLA-B27 QL NAA+PROBE: NEGATIVE

## 2018-01-29 PROCEDURE — 99214 OFFICE O/P EST MOD 30 MIN: CPT | Performed by: FAMILY MEDICINE

## 2018-01-29 NOTE — PROGRESS NOTES
Assessment/Plan:    Gastroesophageal reflux disease without esophagitis  Take omeprazole as needed for gerd which is stable today and not using gerd meds as much    Prediabetes  Recheck HGA1C and cmp at least every 6 months and rec  low sugar diet as directed  Overweight (BMI 25 0-29  9)  Lose weight to get BMI less than 27  Vitamin D deficiency  Take Vitamin D3 5000 IU daily as it is low today and neds to get above 30  Diagnoses and all orders for this visit:    Prediabetes    Overweight (BMI 25 0-29  9)    Vitamin D deficiency    Gastroesophageal reflux disease without esophagitis    Other orders  -     Multiple Vitamins-Minerals (CENTRUM SILVER) tablet; Take by mouth  -     coenzyme Q-10 100 MG capsule; Take by mouth  -     Omega-3 Fatty Acids (FISH OIL) 1,000 mg; Take by mouth  -     Magnesium Oxide -Mg Supplement 400 MG CAPS; Take by mouth  -     meloxicam (MOBIC) 15 mg tablet; Take 1 tablet by mouth Daily  -     omeprazole (PriLOSEC) 20 mg delayed release capsule; Take 1 capsule by mouth daily  -     Cholecalciferol (VITAMIN D3) 2000 units capsule; Take by mouth          Subjective:      Patient ID: Aleta Habermann is a 56 y o  male  Here for diabetes and also vitamin D deficiency and reviewing labs for prediabetes, and vitamin D deficiency  No cp or sob, or ha  Review of Systems   Constitutional: Negative  HENT: Negative  Eyes: Negative  Respiratory: Negative  Cardiovascular: Negative  Gastrointestinal: Negative  Endocrine: Negative  Genitourinary: Negative  Musculoskeletal: Negative  Skin: Negative  Allergic/Immunologic: Negative  Neurological: Negative  Hematological: Negative  Psychiatric/Behavioral: Negative  Objective:     Physical Exam   Constitutional: He is oriented to person, place, and time  He appears well-developed and well-nourished  HENT:   Head: Normocephalic and atraumatic     Nose: Nose normal    Mouth/Throat: Oropharynx is clear and moist    Eyes: Conjunctivae and EOM are normal  Pupils are equal, round, and reactive to light  Neck: Normal range of motion  Neck supple  Cardiovascular: Normal rate, regular rhythm, normal heart sounds and intact distal pulses  Pulmonary/Chest: Effort normal and breath sounds normal    Abdominal: Soft  Bowel sounds are normal    Musculoskeletal: Normal range of motion  Neurological: He is alert and oriented to person, place, and time  He has normal reflexes  Skin: Skin is warm and dry  Psychiatric: He has a normal mood and affect

## 2018-01-29 NOTE — PATIENT INSTRUCTIONS
Check labs in 6 months with office visit for low vitamin D and also prediabetes and being overweight

## 2018-05-08 ENCOUNTER — TRANSCRIBE ORDERS (OUTPATIENT)
Dept: ADMINISTRATIVE | Facility: HOSPITAL | Age: 58
End: 2018-05-08

## 2018-05-08 ENCOUNTER — APPOINTMENT (OUTPATIENT)
Dept: LAB | Facility: HOSPITAL | Age: 58
End: 2018-05-08
Payer: COMMERCIAL

## 2018-05-08 DIAGNOSIS — M15.0 PRIMARY GENERALIZED HYPERTROPHIC OSTEOARTHROSIS: ICD-10-CM

## 2018-05-08 DIAGNOSIS — M15.0 PRIMARY GENERALIZED HYPERTROPHIC OSTEOARTHROSIS: Primary | ICD-10-CM

## 2018-05-08 LAB
ALBUMIN SERPL BCP-MCNC: 4.2 G/DL (ref 3.5–5)
ALP SERPL-CCNC: 49 U/L (ref 46–116)
ALT SERPL W P-5'-P-CCNC: 28 U/L (ref 12–78)
ANION GAP SERPL CALCULATED.3IONS-SCNC: 10 MMOL/L (ref 4–13)
AST SERPL W P-5'-P-CCNC: 16 U/L (ref 5–45)
BILIRUB SERPL-MCNC: 0.47 MG/DL (ref 0.2–1)
BUN SERPL-MCNC: 18 MG/DL (ref 5–25)
CALCIUM SERPL-MCNC: 9.3 MG/DL (ref 8.3–10.1)
CHLORIDE SERPL-SCNC: 104 MMOL/L (ref 100–108)
CO2 SERPL-SCNC: 25 MMOL/L (ref 21–32)
CREAT SERPL-MCNC: 1.24 MG/DL (ref 0.6–1.3)
ERYTHROCYTE [DISTWIDTH] IN BLOOD BY AUTOMATED COUNT: 11.7 % (ref 11.6–15.1)
GFR SERPL CREATININE-BSD FRML MDRD: 65 ML/MIN/1.73SQ M
GLUCOSE P FAST SERPL-MCNC: 100 MG/DL (ref 65–99)
HCT VFR BLD AUTO: 40.7 % (ref 36.5–49.3)
HGB BLD-MCNC: 14.5 G/DL (ref 12–17)
MCH RBC QN AUTO: 31.3 PG (ref 26.8–34.3)
MCHC RBC AUTO-ENTMCNC: 35.6 G/DL (ref 31.4–37.4)
MCV RBC AUTO: 88 FL (ref 82–98)
PLATELET # BLD AUTO: 255 THOUSANDS/UL (ref 149–390)
PMV BLD AUTO: 10.9 FL (ref 8.9–12.7)
POTASSIUM SERPL-SCNC: 3.7 MMOL/L (ref 3.5–5.3)
PROT SERPL-MCNC: 7.3 G/DL (ref 6.4–8.2)
RBC # BLD AUTO: 4.64 MILLION/UL (ref 3.88–5.62)
SODIUM SERPL-SCNC: 139 MMOL/L (ref 136–145)
WBC # BLD AUTO: 7.78 THOUSAND/UL (ref 4.31–10.16)

## 2018-05-08 PROCEDURE — 80053 COMPREHEN METABOLIC PANEL: CPT

## 2018-05-08 PROCEDURE — 85027 COMPLETE CBC AUTOMATED: CPT

## 2018-05-08 PROCEDURE — 36415 COLL VENOUS BLD VENIPUNCTURE: CPT

## 2018-07-23 ENCOUNTER — APPOINTMENT (OUTPATIENT)
Dept: LAB | Facility: HOSPITAL | Age: 58
End: 2018-07-23
Payer: COMMERCIAL

## 2018-07-23 DIAGNOSIS — R73.03 DIABETES MELLITUS, LATENT: Primary | ICD-10-CM

## 2018-07-23 LAB
25(OH)D3 SERPL-MCNC: 61.6 NG/ML (ref 30–100)
ALBUMIN SERPL BCP-MCNC: 3.8 G/DL (ref 3.5–5)
ALP SERPL-CCNC: 39 U/L (ref 46–116)
ALT SERPL W P-5'-P-CCNC: 26 U/L (ref 12–78)
ANION GAP SERPL CALCULATED.3IONS-SCNC: 6 MMOL/L (ref 4–13)
AST SERPL W P-5'-P-CCNC: 16 U/L (ref 5–45)
BILIRUB SERPL-MCNC: 0.3 MG/DL (ref 0.2–1)
BUN SERPL-MCNC: 19 MG/DL (ref 5–25)
CALCIUM SERPL-MCNC: 9.2 MG/DL (ref 8.3–10.1)
CHLORIDE SERPL-SCNC: 105 MMOL/L (ref 100–108)
CO2 SERPL-SCNC: 28 MMOL/L (ref 21–32)
CREAT SERPL-MCNC: 1.19 MG/DL (ref 0.6–1.3)
EST. AVERAGE GLUCOSE BLD GHB EST-MCNC: 128 MG/DL
GFR SERPL CREATININE-BSD FRML MDRD: 68 ML/MIN/1.73SQ M
GLUCOSE P FAST SERPL-MCNC: 100 MG/DL (ref 65–99)
HBA1C MFR BLD: 6.1 % (ref 4.2–6.3)
POTASSIUM SERPL-SCNC: 4.6 MMOL/L (ref 3.5–5.3)
PROT SERPL-MCNC: 6.9 G/DL (ref 6.4–8.2)
SODIUM SERPL-SCNC: 139 MMOL/L (ref 136–145)

## 2018-07-23 PROCEDURE — 82306 VITAMIN D 25 HYDROXY: CPT

## 2018-07-23 PROCEDURE — 80053 COMPREHEN METABOLIC PANEL: CPT

## 2018-07-23 PROCEDURE — 36415 COLL VENOUS BLD VENIPUNCTURE: CPT

## 2018-07-23 PROCEDURE — 83036 HEMOGLOBIN GLYCOSYLATED A1C: CPT

## 2018-07-30 ENCOUNTER — OFFICE VISIT (OUTPATIENT)
Dept: FAMILY MEDICINE CLINIC | Facility: CLINIC | Age: 58
End: 2018-07-30
Payer: COMMERCIAL

## 2018-07-30 VITALS
WEIGHT: 173.4 LBS | DIASTOLIC BLOOD PRESSURE: 78 MMHG | BODY MASS INDEX: 24.27 KG/M2 | SYSTOLIC BLOOD PRESSURE: 122 MMHG | HEIGHT: 71 IN

## 2018-07-30 DIAGNOSIS — Z11.59 ENCOUNTER FOR HEPATITIS C SCREENING TEST FOR LOW RISK PATIENT: ICD-10-CM

## 2018-07-30 DIAGNOSIS — Z12.5 SCREENING FOR PROSTATE CANCER: Primary | ICD-10-CM

## 2018-07-30 DIAGNOSIS — Z11.4 SCREENING FOR HIV (HUMAN IMMUNODEFICIENCY VIRUS): Primary | ICD-10-CM

## 2018-07-30 DIAGNOSIS — E55.9 VITAMIN D DEFICIENCY: ICD-10-CM

## 2018-07-30 DIAGNOSIS — R73.03 PREDIABETES: ICD-10-CM

## 2018-07-30 DIAGNOSIS — Z12.11 SCREENING FOR COLON CANCER: ICD-10-CM

## 2018-07-30 DIAGNOSIS — K21.9 GASTROESOPHAGEAL REFLUX DISEASE WITHOUT ESOPHAGITIS: ICD-10-CM

## 2018-07-30 PROCEDURE — 99214 OFFICE O/P EST MOD 30 MIN: CPT | Performed by: FAMILY MEDICINE

## 2018-07-30 PROCEDURE — 3008F BODY MASS INDEX DOCD: CPT | Performed by: FAMILY MEDICINE

## 2018-07-30 NOTE — PROGRESS NOTES
Assessment/Plan:  Chief Complaint   Patient presents with    Follow-up     prediabetes and review labs  Foot exan     Patient Instructions   Doing well, prediabetes improved to HGA1C of 6 1 from 6 3  Vitamin D deficiency is improved  Gerd stable, use med as directed for this, stay active and call if any problems  Get colon screening as directed  May take Vitamin D3 2000 IU daily and recheck in 6 months  No problem-specific Assessment & Plan notes found for this encounter  Diagnoses and all orders for this visit:    Screening for HIV (human immunodeficiency virus)  -     HIV 1/2 Antigen/Antibody,Fourth Generation W/Rfl; Future    Encounter for hepatitis C screening test for low risk patient  -     Hepatitis panel, acute; Future    Screening for colon cancer  -     Ambulatory referral to Gastroenterology; Future    Prediabetes  -     Cancel: Microalbumin / creatinine urine ratio  -     Comprehensive metabolic panel; Future  -     Hemoglobin A1C    Vitamin D deficiency  -     Vitamin D 25 hydroxy; Future    Gastroesophageal reflux disease without esophagitis          Subjective:      Patient ID: Heaven Aguero is a 66 y o  male  Here for hx of:       Gastroesophageal reflux disease without esophagitis  Takes omeprazole as needed for gerd which is stable today and not using gerd meds as much     Prediabetes  Recheck HGA1C and cmp at least every 6 months and rec  low sugar diet as directed       Overweight (BMI 25 0-29  9)  Lose weight to get BMI less than 27      Vitamin D deficiency  Take Vitamin D3 5000 IU daily as it is now stable, he takes it daily and recent labs are now stable        The following portions of the patient's history were reviewed and updated as appropriate: allergies, current medications, past family history, past medical history, past social history, past surgical history and problem list     Review of Systems   Constitutional: Negative  HENT: Negative  Eyes: Negative  Respiratory: Negative  Cardiovascular: Negative  Gastrointestinal: Negative  Endocrine: Negative  Genitourinary: Negative  Musculoskeletal: Negative  Skin: Negative  Allergic/Immunologic: Negative  Neurological: Negative  Hematological: Negative  Psychiatric/Behavioral: Negative  Objective:      /78   Ht 5' 11" (1 803 m)   Wt 78 7 kg (173 lb 6 4 oz)   BMI 24 18 kg/m²          Physical Exam   Constitutional: He is oriented to person, place, and time  He appears well-developed and well-nourished  HENT:   Head: Normocephalic and atraumatic  Right Ear: External ear normal    Left Ear: External ear normal    Nose: Nose normal    Mouth/Throat: Oropharynx is clear and moist    Eyes: Conjunctivae and EOM are normal  Pupils are equal, round, and reactive to light  Neck: Normal range of motion  Neck supple  Cardiovascular: Normal rate, regular rhythm, normal heart sounds and intact distal pulses  Pulmonary/Chest: Effort normal and breath sounds normal    Musculoskeletal: Normal range of motion  Neurological: He is alert and oriented to person, place, and time  He has normal reflexes  Skin: Skin is warm and dry  Psychiatric: He has a normal mood and affect   His behavior is normal

## 2019-02-04 ENCOUNTER — TELEPHONE (OUTPATIENT)
Dept: FAMILY MEDICINE CLINIC | Facility: CLINIC | Age: 59
End: 2019-02-04

## 2019-02-04 ENCOUNTER — APPOINTMENT (OUTPATIENT)
Dept: LAB | Facility: HOSPITAL | Age: 59
End: 2019-02-04
Payer: COMMERCIAL

## 2019-02-04 DIAGNOSIS — E55.9 VITAMIN D DEFICIENCY: ICD-10-CM

## 2019-02-04 DIAGNOSIS — Z12.5 SCREENING FOR PROSTATE CANCER: ICD-10-CM

## 2019-02-04 DIAGNOSIS — R73.03 PREDIABETES: ICD-10-CM

## 2019-02-04 DIAGNOSIS — Z11.4 SCREENING FOR HIV (HUMAN IMMUNODEFICIENCY VIRUS): ICD-10-CM

## 2019-02-04 DIAGNOSIS — Z11.59 ENCOUNTER FOR HEPATITIS C SCREENING TEST FOR LOW RISK PATIENT: ICD-10-CM

## 2019-02-04 LAB
25(OH)D3 SERPL-MCNC: 23 NG/ML (ref 30–100)
ALBUMIN SERPL BCP-MCNC: 3.7 G/DL (ref 3.5–5)
ALP SERPL-CCNC: 47 U/L (ref 46–116)
ALT SERPL W P-5'-P-CCNC: 32 U/L (ref 12–78)
ANION GAP SERPL CALCULATED.3IONS-SCNC: 7 MMOL/L (ref 4–13)
AST SERPL W P-5'-P-CCNC: 17 U/L (ref 5–45)
BILIRUB SERPL-MCNC: 0.26 MG/DL (ref 0.2–1)
BUN SERPL-MCNC: 20 MG/DL (ref 5–25)
CALCIUM SERPL-MCNC: 9.1 MG/DL (ref 8.3–10.1)
CHLORIDE SERPL-SCNC: 105 MMOL/L (ref 100–108)
CO2 SERPL-SCNC: 26 MMOL/L (ref 21–32)
CREAT SERPL-MCNC: 1.17 MG/DL (ref 0.6–1.3)
EST. AVERAGE GLUCOSE BLD GHB EST-MCNC: 128 MG/DL
GFR SERPL CREATININE-BSD FRML MDRD: 69 ML/MIN/1.73SQ M
GLUCOSE P FAST SERPL-MCNC: 112 MG/DL (ref 65–99)
HBA1C MFR BLD: 6.1 % (ref 4.2–6.3)
HIV 1+2 AB+HIV1 P24 AG SERPL QL IA: NORMAL
HIV1 P24 AG SER QL: NORMAL
POTASSIUM SERPL-SCNC: 4.7 MMOL/L (ref 3.5–5.3)
PROT SERPL-MCNC: 6.7 G/DL (ref 6.4–8.2)
SODIUM SERPL-SCNC: 138 MMOL/L (ref 136–145)

## 2019-02-04 PROCEDURE — 36415 COLL VENOUS BLD VENIPUNCTURE: CPT

## 2019-02-04 PROCEDURE — 87806 HIV AG W/HIV1&2 ANTB W/OPTIC: CPT

## 2019-02-04 PROCEDURE — 83036 HEMOGLOBIN GLYCOSYLATED A1C: CPT | Performed by: FAMILY MEDICINE

## 2019-02-04 PROCEDURE — 84153 ASSAY OF PSA TOTAL: CPT

## 2019-02-04 PROCEDURE — 80053 COMPREHEN METABOLIC PANEL: CPT

## 2019-02-04 PROCEDURE — 80074 ACUTE HEPATITIS PANEL: CPT

## 2019-02-04 PROCEDURE — 82306 VITAMIN D 25 HYDROXY: CPT

## 2019-02-04 PROCEDURE — 84154 ASSAY OF PSA FREE: CPT

## 2019-02-04 NOTE — TELEPHONE ENCOUNTER
[facility] and wanted to make you aware to review the rapid HIV test for patient

## 2019-02-05 LAB
HAV IGM SER QL: NORMAL
HBV CORE IGM SER QL: NORMAL
HBV SURFACE AG SER QL: NORMAL
HCV AB SER QL: NORMAL
PSA FREE MFR SERPL: 10.1 %
PSA FREE SERPL-MCNC: 0.69 NG/ML
PSA SERPL-MCNC: 6.8 NG/ML (ref 0–4)

## 2019-02-06 DIAGNOSIS — R97.20 ELEVATED PSA: Primary | ICD-10-CM

## 2019-02-11 ENCOUNTER — OFFICE VISIT (OUTPATIENT)
Dept: FAMILY MEDICINE CLINIC | Facility: CLINIC | Age: 59
End: 2019-02-11
Payer: COMMERCIAL

## 2019-02-11 ENCOUNTER — CONSULT (OUTPATIENT)
Dept: UROLOGY | Facility: MEDICAL CENTER | Age: 59
End: 2019-02-11
Payer: COMMERCIAL

## 2019-02-11 VITALS
WEIGHT: 181 LBS | SYSTOLIC BLOOD PRESSURE: 118 MMHG | BODY MASS INDEX: 25.34 KG/M2 | DIASTOLIC BLOOD PRESSURE: 84 MMHG | HEART RATE: 74 BPM | HEIGHT: 71 IN

## 2019-02-11 VITALS
DIASTOLIC BLOOD PRESSURE: 76 MMHG | SYSTOLIC BLOOD PRESSURE: 120 MMHG | WEIGHT: 176.6 LBS | BODY MASS INDEX: 25.28 KG/M2 | HEIGHT: 70 IN

## 2019-02-11 DIAGNOSIS — K21.9 GASTROESOPHAGEAL REFLUX DISEASE WITHOUT ESOPHAGITIS: ICD-10-CM

## 2019-02-11 DIAGNOSIS — R97.20 ELEVATED PSA: ICD-10-CM

## 2019-02-11 DIAGNOSIS — R97.20 ELEVATED PSA: Primary | ICD-10-CM

## 2019-02-11 DIAGNOSIS — N40.2 PROSTATE NODULE: ICD-10-CM

## 2019-02-11 DIAGNOSIS — E55.9 VITAMIN D DEFICIENCY: ICD-10-CM

## 2019-02-11 DIAGNOSIS — E66.3 OVERWEIGHT (BMI 25.0-29.9): ICD-10-CM

## 2019-02-11 DIAGNOSIS — Z12.11 SCREENING FOR COLON CANCER: Primary | ICD-10-CM

## 2019-02-11 DIAGNOSIS — R73.03 PREDIABETES: ICD-10-CM

## 2019-02-11 LAB
SL AMB  POCT GLUCOSE, UA: NORMAL
SL AMB LEUKOCYTE ESTERASE,UA: NORMAL
SL AMB POCT BILIRUBIN,UA: NORMAL
SL AMB POCT BLOOD,UA: NORMAL
SL AMB POCT CLARITY,UA: CLEAR
SL AMB POCT COLOR,UA: YELLOW
SL AMB POCT KETONES,UA: NORMAL
SL AMB POCT NITRITE,UA: NORMAL
SL AMB POCT PH,UA: 5.5
SL AMB POCT SPECIFIC GRAVITY,UA: 1.01
SL AMB POCT URINE PROTEIN: NORMAL
SL AMB POCT UROBILINOGEN: 0.2

## 2019-02-11 PROCEDURE — 81003 URINALYSIS AUTO W/O SCOPE: CPT | Performed by: UROLOGY

## 2019-02-11 PROCEDURE — 99214 OFFICE O/P EST MOD 30 MIN: CPT | Performed by: FAMILY MEDICINE

## 2019-02-11 PROCEDURE — 99244 OFF/OP CNSLTJ NEW/EST MOD 40: CPT | Performed by: UROLOGY

## 2019-02-11 RX ORDER — OXYCODONE HYDROCHLORIDE 5 MG/1
5 TABLET ORAL ONCE
Qty: 1 TABLET | Refills: 0 | Status: SHIPPED | OUTPATIENT
Start: 2019-02-11 | End: 2019-02-11

## 2019-02-11 RX ORDER — ALPRAZOLAM 0.25 MG/1
0.5 TABLET ORAL ONCE
Qty: 2 TABLET | Refills: 0 | Status: SHIPPED | OUTPATIENT
Start: 2019-02-11 | End: 2019-09-23 | Stop reason: SDUPTHER

## 2019-02-11 RX ORDER — CIPROFLOXACIN 500 MG/1
500 TABLET, FILM COATED ORAL 2 TIMES DAILY
Qty: 4 TABLET | Refills: 0 | Status: SHIPPED | OUTPATIENT
Start: 2019-02-11 | End: 2019-02-13

## 2019-02-11 RX ORDER — CEPHALEXIN 500 MG/1
500 CAPSULE ORAL 2 TIMES DAILY
Qty: 4 CAPSULE | Refills: 0 | Status: SHIPPED | OUTPATIENT
Start: 2019-02-11 | End: 2019-02-13

## 2019-02-11 NOTE — PROGRESS NOTES
Assessment/Plan:  Chief Complaint   Patient presents with    Follow-up     prediabetes     Patient Instructions   Doing well, prediabetes HGA1C of 6 1  Vitamin D deficiency is worse  Gerd stable, use med as directed for this, stay active and call if any problems  Get colon screening as directed  May take Vitamin D3 5000 IU daily and recheck in 6 months                  No problem-specific Assessment & Plan notes found for this encounter  Diagnoses and all orders for this visit:    Screening for colon cancer  -     Ambulatory referral to Gastroenterology; Future    Prediabetes  -     Comprehensive metabolic panel; Future  -     Hemoglobin A1C    Vitamin D deficiency  -     Vitamin D 25 hydroxy; Future    Overweight (BMI 25 0-29  9)    Gastroesophageal reflux disease without esophagitis    Elevated PSA    Other orders  -     Ascorbic Acid (VITAMIN C PO); Take by mouth  -     Probiotic Product (PROBIOTIC DAILY PO); Take by mouth  -     MELATONIN PO; Take by mouth          Subjective:      Patient ID: Lashell Pang is a 66 y o  male  Here for 6 month f-up and saw urology today for elevated PSA and prostate nodule  He needs a biopsy and is scheduled in march 2019  Here to review labs  The following portions of the patient's history were reviewed and updated as appropriate: allergies, current medications, past family history, past medical history, past social history, past surgical history and problem list     Review of Systems   Constitutional: Negative  HENT: Negative  Eyes: Negative  Respiratory: Negative  Cardiovascular: Negative  Gastrointestinal: Negative  Endocrine: Negative  Genitourinary: Negative  Musculoskeletal: Negative  Skin: Negative  Allergic/Immunologic: Negative  Neurological: Negative  Hematological: Negative  Psychiatric/Behavioral: Negative            Objective:      /76   Ht 5' 9 5" (1 765 m)   Wt 80 1 kg (176 lb 9 6 oz)   BMI 25 71 kg/m² Physical Exam   Constitutional: He is oriented to person, place, and time  He appears well-developed and well-nourished  HENT:   Head: Normocephalic and atraumatic  Right Ear: External ear normal    Left Ear: External ear normal    Nose: Nose normal    Mouth/Throat: Oropharynx is clear and moist    Eyes: Pupils are equal, round, and reactive to light  Conjunctivae and EOM are normal    Neck: Normal range of motion  Neck supple  Cardiovascular: Normal rate, regular rhythm, normal heart sounds and intact distal pulses  Pulmonary/Chest: Effort normal and breath sounds normal    Musculoskeletal: Normal range of motion  Neurological: He is alert and oriented to person, place, and time  He has normal reflexes  Skin: Skin is warm and dry  Psychiatric: He has a normal mood and affect   His behavior is normal

## 2019-02-11 NOTE — PROGRESS NOTES
Assessment/Plan:      Diagnoses and all orders for this visit:    Elevated PSA  -     Ambulatory referral to Urology  -     POCT urine dip auto non-scope  -     Biopsy prostate; Future  -     cephalexin (KEFLEX) 500 mg capsule; Take 1 capsule (500 mg total) by mouth 2 (two) times a day for 2 days  -     ciprofloxacin (CIPRO) 500 mg tablet; Take 1 tablet (500 mg total) by mouth 2 (two) times a day for 2 days  -     ALPRAZolam (XANAX) 0 25 mg tablet; Take 2 tablets (0 5 mg total) by mouth once for 1 dose  -     oxyCODONE (ROXICODONE) 5 mg immediate release tablet; Take 1 tablet (5 mg total) by mouth once for 1 doseMax Daily Amount: 5 mg    Prostate nodule  -     Biopsy prostate; Future  -     cephalexin (KEFLEX) 500 mg capsule; Take 1 capsule (500 mg total) by mouth 2 (two) times a day for 2 days  -     ciprofloxacin (CIPRO) 500 mg tablet; Take 1 tablet (500 mg total) by mouth 2 (two) times a day for 2 days  -     ALPRAZolam (XANAX) 0 25 mg tablet; Take 2 tablets (0 5 mg total) by mouth once for 1 dose  -     oxyCODONE (ROXICODONE) 5 mg immediate release tablet; Take 1 tablet (5 mg total) by mouth once for 1 doseMax Daily Amount: 5 mg          Subjective:  Elevated PSA     Patient ID: Corey Anderson is a 66 y o  male  HPI  Patient recent found to have a follow-up screening PSA abnormal elevation of 6 8, with a percent free of 10%  Upon review of his prior records a PSA level obtained in April of 2017 was 4 0  The patient denies any prior urologic history, no history of UTI or voiding symptoms to report such as dysuria, hematuria, flank or abdominal pains  He states he has a good appetite and normal bowel function  He denies any weight loss, or pain in new locations  Review of Systems   Constitutional: Negative  HENT: Negative  Eyes: Negative  Respiratory: Negative  Cardiovascular: Negative  Gastrointestinal: Negative  Endocrine: Negative  Genitourinary: Negative      Musculoskeletal: Negative  Skin: Negative  Allergic/Immunologic: Negative  Neurological: Negative  Hematological: Negative  Psychiatric/Behavioral: Negative  Objective:     Physical Exam   Constitutional: He is oriented to person, place, and time  He appears well-developed and well-nourished  No distress  HENT:   Head: Normocephalic and atraumatic  Nose: Nose normal    Mouth/Throat: Oropharynx is clear and moist    Eyes: Pupils are equal, round, and reactive to light  Conjunctivae and EOM are normal  No scleral icterus  Neck: Normal range of motion  Neck supple  Cardiovascular: Normal rate, regular rhythm, normal heart sounds and intact distal pulses  No murmur heard  Pulmonary/Chest: Effort normal and breath sounds normal  No respiratory distress  He has no wheezes  He has no rales  Abdominal: Soft  Bowel sounds are normal  He exhibits no distension and no mass  There is no tenderness  Genitourinary: Prostate is enlarged  Prostate is not tender  Genitourinary Comments: Prostate exam:  1 to 2/4 enlargement with a firm nodule in the right lobe base  Musculoskeletal: Normal range of motion  He exhibits no edema or tenderness  Lymphadenopathy:     He has no cervical adenopathy  Neurological: He is alert and oriented to person, place, and time  No cranial nerve deficit  Skin: Skin is warm and dry  No rash noted  No erythema  No pallor  Psychiatric: He has a normal mood and affect  His behavior is normal  Judgment and thought content normal    Nursing note and vitals reviewed          PSA from 02/04/2019 was 6 8, with a percent free of 10%  PSA from 04/04/2017 was 4 0

## 2019-02-11 NOTE — LETTER
February 11, 2019     Nguyễn Childs, DO  [address] [facility] [address]    Patient: Rosalva Carlson   YOB: 1960   Date of Visit: 2/11/2019       Dear Dr Damian Rubi: Thank you for referring Rosalva Carlson to me for evaluation  Below are my notes for this consultation  If you have questions, please do not hesitate to call me  I look forward to following your patient along with you  Sincerely,        Kari Cruz MD        CC: No Recipients  Kari Cruz MD  2/11/2019 11:45 AM  Incomplete  Assessment/Plan:      Diagnoses and all orders for this visit:    Elevated PSA  -     Ambulatory referral to Urology  -     POCT urine dip auto non-scope  -     Biopsy prostate; Future  -     cephalexin (KEFLEX) 500 mg capsule; Take 1 capsule (500 mg total) by mouth 2 (two) times a day for 2 days  -     ciprofloxacin (CIPRO) 500 mg tablet; Take 1 tablet (500 mg total) by mouth 2 (two) times a day for 2 days  -     ALPRAZolam (XANAX) 0 25 mg tablet; Take 2 tablets (0 5 mg total) by mouth once for 1 dose  -     oxyCODONE (ROXICODONE) 5 mg immediate release tablet; Take 1 tablet (5 mg total) by mouth once for 1 doseMax Daily Amount: 5 mg    Prostate nodule  -     Biopsy prostate; Future  -     cephalexin (KEFLEX) 500 mg capsule; Take 1 capsule (500 mg total) by mouth 2 (two) times a day for 2 days  -     ciprofloxacin (CIPRO) 500 mg tablet; Take 1 tablet (500 mg total) by mouth 2 (two) times a day for 2 days  -     ALPRAZolam (XANAX) 0 25 mg tablet; Take 2 tablets (0 5 mg total) by mouth once for 1 dose  -     oxyCODONE (ROXICODONE) 5 mg immediate release tablet; Take 1 tablet (5 mg total) by mouth once for 1 doseMax Daily Amount: 5 mg          Subjective:  Elevated PSA     Patient ID: Rosalva Carlson is a 66 y o  male  HPI  Patient recent found to have a follow-up screening PSA abnormal elevation of 6 8 with a percent free of 10%    Upon review of his prior records a level obtained in April of 2017 was 4 0  The patient denies any prior urologic history, no history of UTI or voiding symptoms to report such as dysuria, hematuria, flank or abdominal pains  He states he has a good appetite and normal bowel function  He denies any weight loss, or pain in new locations  Review of Systems   Constitutional: Negative  HENT: Negative  Eyes: Negative  Respiratory: Negative  Cardiovascular: Negative  Gastrointestinal: Negative  Endocrine: Negative  Genitourinary: Negative  Musculoskeletal: Negative  Skin: Negative  Allergic/Immunologic: Negative  Neurological: Negative  Hematological: Negative  Psychiatric/Behavioral: Negative  Objective:     Physical Exam   Constitutional: He is oriented to person, place, and time  He appears well-developed and well-nourished  No distress  HENT:   Head: Normocephalic and atraumatic  Nose: Nose normal    Mouth/Throat: Oropharynx is clear and moist    Eyes: Pupils are equal, round, and reactive to light  Conjunctivae and EOM are normal  No scleral icterus  Neck: Normal range of motion  Neck supple  Cardiovascular: Normal rate, regular rhythm, normal heart sounds and intact distal pulses  No murmur heard  Pulmonary/Chest: Effort normal and breath sounds normal  No respiratory distress  He has no wheezes  He has no rales  Abdominal: Soft  Bowel sounds are normal  He exhibits no distension and no mass  There is no tenderness  Genitourinary: Prostate is enlarged  Prostate is not tender  Genitourinary Comments: Prostate exam:  1 to 2/4 enlargement with a firm nodule in the right lobe base  Musculoskeletal: Normal range of motion  He exhibits no edema or tenderness  Lymphadenopathy:     He has no cervical adenopathy  Neurological: He is alert and oriented to person, place, and time  No cranial nerve deficit  Skin: Skin is warm and dry  No rash noted  No erythema  No pallor     Psychiatric: He has a normal mood and affect  His behavior is normal  Judgment and thought content normal    Nursing note and vitals reviewed          PSA from 02/04/2019 was 6 8, with a percent free of 10%  PSA from 04/04/2017 was 4 0

## 2019-02-11 NOTE — PATIENT INSTRUCTIONS
Doing well, prediabetes HGA1C of 6 1  Vitamin D deficiency is worse  Gerd stable, use med as directed for this, stay active and call if any problems  Get colon screening as directed   May take Vitamin D3 5000 IU daily and recheck in 6 months

## 2019-03-05 ENCOUNTER — PROCEDURE VISIT (OUTPATIENT)
Dept: UROLOGY | Facility: MEDICAL CENTER | Age: 59
End: 2019-03-05
Payer: COMMERCIAL

## 2019-03-05 VITALS — HEIGHT: 70 IN | WEIGHT: 185 LBS | BODY MASS INDEX: 26.48 KG/M2

## 2019-03-05 DIAGNOSIS — R97.20 ELEVATED PSA: Primary | ICD-10-CM

## 2019-03-05 DIAGNOSIS — N40.2 PROSTATE NODULE: ICD-10-CM

## 2019-03-05 LAB
SL AMB  POCT GLUCOSE, UA: ABNORMAL
SL AMB LEUKOCYTE ESTERASE,UA: ABNORMAL
SL AMB POCT BILIRUBIN,UA: ABNORMAL
SL AMB POCT BLOOD,UA: ABNORMAL
SL AMB POCT CLARITY,UA: CLEAR
SL AMB POCT COLOR,UA: YELLOW
SL AMB POCT KETONES,UA: ABNORMAL
SL AMB POCT NITRITE,UA: ABNORMAL
SL AMB POCT PH,UA: 5
SL AMB POCT SPECIFIC GRAVITY,UA: 1.01
SL AMB POCT URINE PROTEIN: ABNORMAL
SL AMB POCT UROBILINOGEN: 0.2

## 2019-03-05 PROCEDURE — 76942 ECHO GUIDE FOR BIOPSY: CPT | Performed by: UROLOGY

## 2019-03-05 PROCEDURE — 88344 IMHCHEM/IMCYTCHM EA MLT ANTB: CPT | Performed by: PATHOLOGY

## 2019-03-05 PROCEDURE — G0416 PROSTATE BIOPSY, ANY MTHD: HCPCS | Performed by: PATHOLOGY

## 2019-03-05 PROCEDURE — 55700 PR BIOPSY OF PROSTATE,NEEDLE/PUNCH: CPT | Performed by: UROLOGY

## 2019-03-05 PROCEDURE — 99214 OFFICE O/P EST MOD 30 MIN: CPT | Performed by: UROLOGY

## 2019-03-05 PROCEDURE — 81003 URINALYSIS AUTO W/O SCOPE: CPT | Performed by: UROLOGY

## 2019-03-05 RX ORDER — CIPROFLOXACIN 500 MG/1
TABLET, FILM COATED ORAL
Refills: 0 | COMMUNITY
Start: 2019-03-01 | End: 2019-10-16 | Stop reason: ALTCHOICE

## 2019-03-05 RX ORDER — CYCLOBENZAPRINE HCL 5 MG
TABLET ORAL
Refills: 3 | COMMUNITY
Start: 2019-02-26 | End: 2019-10-16 | Stop reason: ALTCHOICE

## 2019-03-05 RX ORDER — CEPHALEXIN 500 MG/1
CAPSULE ORAL
Refills: 0 | COMMUNITY
Start: 2019-03-01 | End: 2019-10-16 | Stop reason: ALTCHOICE

## 2019-03-05 NOTE — PROGRESS NOTES
Assessment/Plan:      Diagnoses and all orders for this visit:    Elevated PSA  -     POCT urine dip auto non-scope    Prostate nodule  -     POCT urine dip auto non-scope    Other orders  -     cephalexin (KEFLEX) 500 mg capsule; TK 1 C PO BID FOR 2 DAYS  -     ciprofloxacin (CIPRO) 500 mg tablet; TK 1 T PO BID FOR 2 DAYS  -     cyclobenzaprine (FLEXERIL) 5 mg tablet; TK 1 T PO Q 12 H PRN          Subjective:  Prostate biopsy today     Patient ID: Mj Vela is a 66 y o  male  HPI  Patient recent found to have a follow-up screening PSA abnormal elevation of 6 8, with a percent free of 10%  Upon review of his prior records a PSA level obtained in April of 2017 was 4 0  The patient denies any prior urologic history, no history of UTI or voiding symptoms to report such as dysuria, hematuria, flank or abdominal pains  He states he has a good appetite and normal bowel function  He denies any weight loss, or pain in new locations  On digital rectal exam he was found to have a nodule in the base of the right prostate lobe  He is for prostate biopsy today  Review of Systems   Constitutional: Negative  HENT: Negative  Eyes: Negative  Respiratory: Negative  Cardiovascular: Negative  Gastrointestinal: Negative  Endocrine: Negative  Genitourinary: Negative  Musculoskeletal: Negative  Skin: Negative  Allergic/Immunologic: Negative  Neurological: Negative  Hematological: Negative  Psychiatric/Behavioral: Negative  Objective:     Physical Exam   Constitutional: He is oriented to person, place, and time  He appears well-developed and well-nourished  No distress  HENT:   Head: Normocephalic and atraumatic  Nose: Nose normal    Mouth/Throat: Oropharynx is clear and moist    Eyes: Pupils are equal, round, and reactive to light  Conjunctivae and EOM are normal  No scleral icterus  Neck: Normal range of motion  Neck supple     Cardiovascular: Normal rate, regular rhythm, normal heart sounds and intact distal pulses  No murmur heard  Pulmonary/Chest: Effort normal and breath sounds normal  No respiratory distress  He has no wheezes  He has no rales  Abdominal: Soft  Bowel sounds are normal  He exhibits no distension and no mass  There is no tenderness  Musculoskeletal: Normal range of motion  He exhibits no edema or tenderness  Lymphadenopathy:     He has no cervical adenopathy  Neurological: He is alert and oriented to person, place, and time  No cranial nerve deficit  Skin: Skin is warm and dry  No rash noted  No erythema  No pallor  Psychiatric: He has a normal mood and affect  His behavior is normal  Judgment and thought content normal    Nursing note and vitals reviewed        Refer to prostate biopsy procedure note

## 2019-03-05 NOTE — PROGRESS NOTES
Biopsy prostate     Date/Time 3/5/2019 10:27 AM     Performed by  Reji Shetty MD     Authorized by Reji Shetty MD       Local anesthesia used: yes      Anesthesia: local infiltration     Anesthesia   Local anesthesia used: yes  Local Anesthetic: lidocaine 1% with epinephrine     Procedure Details   Patient tolerance: Patient tolerated the procedure well with no immediate complications         Patient reports he self administered the usual instructed preprocedure antibiotic and prepped  He was placed in left decubitus position and following digital exam the transrectal ultrasound probe was inserted  Axial and sagittal views of the prostate were noted and studied  There was no obvious hyperechoic or hypoechoic nodule seen  Approximate volume of the prostate as measured was 26 cc  With a long spinal needle 1% lidocaine was then infiltrated into the neurovascular bundles bilaterally  Under direct ultrasound guidance, a biopsy gun was used to perform sextant biopsies from each lobe for a total of 12 cores  The cores were sent for pathologic analysis  The ultrasound probe was removed, and he tolerated procedure well  He was instructed on the post biopsy protocol

## 2019-03-08 ENCOUNTER — TELEPHONE (OUTPATIENT)
Dept: UROLOGY | Facility: MEDICAL CENTER | Age: 59
End: 2019-03-08

## 2019-03-08 NOTE — TELEPHONE ENCOUNTER
Prostate biopsy findings:  1 out of the 12 cores positive for [place] score 6= 3+ 3 prostate cancer in 5% of the core

## 2019-03-11 ENCOUNTER — OFFICE VISIT (OUTPATIENT)
Dept: UROLOGY | Facility: MEDICAL CENTER | Age: 59
End: 2019-03-11
Payer: COMMERCIAL

## 2019-03-11 VITALS
BODY MASS INDEX: 25.62 KG/M2 | HEIGHT: 70 IN | HEART RATE: 79 BPM | SYSTOLIC BLOOD PRESSURE: 120 MMHG | DIASTOLIC BLOOD PRESSURE: 70 MMHG | WEIGHT: 179 LBS

## 2019-03-11 DIAGNOSIS — C61 PROSTATE CANCER (HCC): Primary | ICD-10-CM

## 2019-03-11 PROCEDURE — 99215 OFFICE O/P EST HI 40 MIN: CPT | Performed by: UROLOGY

## 2019-03-11 RX ORDER — OXYCODONE HYDROCHLORIDE 5 MG/1
5 TABLET ORAL ONCE
Qty: 1 TABLET | Refills: 0 | Status: SHIPPED | OUTPATIENT
Start: 2019-03-11 | End: 2019-03-11

## 2019-03-11 RX ORDER — CEPHALEXIN 500 MG/1
500 CAPSULE ORAL 2 TIMES DAILY
Qty: 4 CAPSULE | Refills: 0 | Status: SHIPPED | OUTPATIENT
Start: 2019-03-11 | End: 2019-03-13

## 2019-03-11 RX ORDER — ALPRAZOLAM 0.25 MG/1
0.5 TABLET ORAL ONCE
Qty: 2 TABLET | Refills: 0 | Status: SHIPPED | OUTPATIENT
Start: 2019-03-11 | End: 2019-09-23 | Stop reason: SDUPTHER

## 2019-03-11 RX ORDER — CIPROFLOXACIN 500 MG/1
500 TABLET, FILM COATED ORAL 2 TIMES DAILY
Qty: 4 TABLET | Refills: 0 | Status: SHIPPED | OUTPATIENT
Start: 2019-03-11 | End: 2019-03-13

## 2019-03-11 NOTE — LETTER
March 11, 2019     Bryant Ryan DO  [address] [facility] [address]    Patient: Nickie Richmond   YOB: 1960   Date of Visit: 3/11/2019       Dear Dr Ree Echevarria: Thank you for referring Nickie Richmond to me for evaluation  Below are my notes for this consultation  If you have questions, please do not hesitate to call me  I look forward to following your patient along with you  Sincerely,        Ann-Marie Smith MD        CC: DO Ann-Marie Healy MD  3/11/2019 10:30 AM  Sign at close encounter  Assessment/Plan:      Diagnoses and all orders for this visit:    Prostate cancer ([facility] [address] )  -     ciprofloxacin (CIPRO) 500 mg tablet; Take 1 tablet (500 mg total) by mouth 2 (two) times a day for 2 days  -     cephalexin (KEFLEX) 500 mg capsule; Take 1 capsule (500 mg total) by mouth 2 (two) times a day for 2 days  -     oxyCODONE (ROXICODONE) 5 mg immediate release tablet; Take 1 tablet (5 mg total) by mouth once for 1 doseMax Daily Amount: 5 mg  -     ALPRAZolam (XANAX) 0 25 mg tablet; Take 2 tablets (0 5 mg total) by mouth once for 1 dose  -     Biopsy prostate; Future        Plan:  Patient agrees to active surveillance with a re-biopsy planned in 3 months  After careful review and summary of the medical history and all laboratory and radiographic studies as outlined above, I discussed with the patient and family members at length the finding of adenocarcinoma of the prostate  We discussed the patient's grade (Topping score 6), other parameters, and clinical stage based upon the above findings  I then discussed the options available to him at this time, based on his clinically localized prostate cancer  These include observation, radiation therapy, radical prostatectomy and cryosurgery   Regarding observation, they understand this is ideally suited for men with either clinically insignificant or slow growing cancer or for whom because of age or other concurrent medical conditions the risks of treatment are greater than the potential benefits of treatment  While this option avoids treatment-associated side effects, I explained it requires frequent evaluation in order to monitor for potential disease progression, and that despite vigilant follow-up there will be cases where the cancer spreads and is no longer potentially curable with local therapies  I reiterated that for many men, watchful waiting can be associated with increased anxiety and stress concerning their diagnosis, and that commonly patients only defer definitive treatment to a later date  I counseled him that based on his pathology, he was a good candidate for active surveillance  To further ensure that he has reasonably low-grade cancer moving forward I recommended a confirmatory prostate biopsy in 3 months to reassess the prior positive area  With regard to radiation therapy, I reviewed the options available  These include external beam radiation therapy versus brachytherapy  Regarding XRT, we discussed the differences in conventional versus 3D conformal external beam radiation therapy versus IMRT  The indications, risks and potential complications of each of these options as well as the rationale for using selective radiation options were reviewed in detail  We also discussed interstitial seed therapy and risk stratification as a determinant for optimal therapies  All questions were again answered  I did offer to refer the patient to the radiation oncologists directly in order to further clarify these issues  With regard to the surgical options, we reviewed the different options including a radical perineal prostatectomy versus the radical retropubic prostatectomy via open, laparoscopic and robotic approach   I explained the operations that I primarily perform, robotic-assisted laparoscopic radical prostatectomy, and compared it with the alternative that I am also experienced with, traditional open retropubic prostatectomy  I also mentioned the less commonly used but accepted perineal approach  The advantages and limitations of these various approaches were described in detail  The anticipated hospital and post-operative courses were reviewed  I highlighted the relevant anatomy, and we discussed the importance of neurovascular bundle preservation (nerve-sparing) to minimize negative effects on erectile function and continence  While in the majority of cases bilateral nerve-sparing is appropriate and possible, they understand that in certain circumstances intentional partial or complete unilateral or bilateral neurovascular bundle resection is necessary when there is suspicion that cancer extends into that region; this may be determined either preoperatively or intraoperatively with or without biopsy  The patient is clearly aware that he may still be rendered sexually impotent and incontinent as a consequence of the operative procedure  The possibility of stress-type urinary incontinence associated with either form of radical prostatectomy was also detailed for the patient  I explained that concurrent pelvic lymphadenectomy may be performed, for intermediate, or high risk clinical disease or suspicious intraoperative adenopathy, which serves both a diagnostic and potentially therapeutic purpose if metastatic disease is identified  We discussed the general risks of surgery, which include but are not limited to infection, bleeding, medical and anesthetic complications, and adjacent organ involvement or injury  With regard to cryosurgery, this is felt to be an experimental option at this time secondary to insufficient data to support its routine use in this clinical setting       Regarding hormonal therapy, we discussed it is primarily used as systemic therapy for patients with advanced or metastatic disease, but that it also has been shown to be beneficial in conjunction with radiation therapy for certain categories of patients  The patient understands that hormonal therapy alone is not considered curative treatment and will slow the growth of but not eliminate prostate cancer  The patient was advised to become an active participant in their care and to become proactive in their care  They were encouraged to call my office with any unanswered questions or seek additional medical opinions at their discretion  They understand that the decision as to which approach to take is one made based on the medical risks and benefits as well as their own informed tolerance of this risk  Subjective:  No complaints     Patient ID: Abdi Dougherty is a 66 y o  male  HPI  Patient recent found to have a follow-up screening PSA abnormal elevation of 6 8, with a percent free of 10%   Upon review of his prior records a PSA level obtained in April of 2017 was 4 0   The patient denies any prior urologic history, no history of UTI or voiding symptoms to report such as dysuria, hematuria, flank or abdominal pains   He states he has a good appetite and normal bowel function   He denies any weight loss, or pain in new locations  Review of Systems   Constitutional: Negative  HENT: Negative  Eyes: Negative  Respiratory: Negative  Cardiovascular: Negative  Gastrointestinal: Negative  Endocrine: Negative  Genitourinary: Negative  Musculoskeletal: Negative  Skin: Negative  Allergic/Immunologic: Negative  Neurological: Negative  Hematological: Negative  Psychiatric/Behavioral: Negative  Objective:     Physical Exam   Constitutional: He is oriented to person, place, and time  He appears well-developed and well-nourished  No distress  HENT:   Head: Normocephalic and atraumatic  Nose: Nose normal    Mouth/Throat: Oropharynx is clear and moist    Eyes: Pupils are equal, round, and reactive to light  Conjunctivae and EOM are normal  No scleral icterus     Neck: Normal range of motion  Neck supple  Cardiovascular: Normal rate, regular rhythm, normal heart sounds and intact distal pulses  No murmur heard  Pulmonary/Chest: Effort normal and breath sounds normal  No respiratory distress  He has no wheezes  He has no rales  Abdominal: Soft  Bowel sounds are normal  He exhibits no distension and no mass  There is no tenderness  Musculoskeletal: Normal range of motion  He exhibits no edema or tenderness  Lymphadenopathy:     He has no cervical adenopathy  Neurological: He is alert and oriented to person, place, and time  No cranial nerve deficit  Skin: Skin is warm and dry  No rash noted  No erythema  No pallor  Psychiatric: He has a normal mood and affect  His behavior is normal  Judgment and thought content normal    Nursing note and vitals reviewed  Final Diagnosis  A  Prostate, Left Lateral Base, Biopsy:  - Benign prostatic tissue  B  Prostate, Left Lateral Mid, Biopsy:  - Benign prostatic tissue  [id number]  Flora Mtz  [id number]  Page: 1 of 3 Printed: 3/7/2019 9:08 AM  C  Prostate, Left Lateral [place], Biopsy:  - Benign prostatic tissue  D  Prostate, Left Base, Biopsy:  - Benign prostatic tissue with acute inflammation (prostatitis)  E  Prostate, Left Mid, Biopsy:  - Benign prostatic tissue  F  Prostate, Left [place], Biopsy:  - Prostatic adenocarcinoma, acinar pattern, Ángel Score 3 + 3 = 6, Prognostic Grade Group I, continuously involving one of one  submitted core (1 mm in length, 5% of total tissue)  - PIN4 immunohistochemistry supports the diagnosis  G  Prostate, Right Lateral Base, Biopsy:  - Benign prostatic tissue  H  Prostate, Right Lateral Mid, Biopsy:  - Benign prostatic tissue  I  Prostate, Right Lateral [place], Biopsy:  - Benign prostatic tissue  J  Prostate, Right Base, Biopsy:  - Benign prostatic tissue  K  Prostate, Right Mid, Biopsy:  - Benign prostatic tissue  L  Prostate, Right [place], Biopsy:  - Benign prostatic tissue    Electronically signed by Meghan Arevalo,  on 3/7/2019 at [phone]    Note  Intradepartmental consultation is in agreement (Sheila Antony)  Additional Information  All controls performed with the immunohistochemical stains reported above reacted appropriately  These tests were  developed and their performance characteristics determined by [facility] or [address] [place] [address]  They may not be cleared or approved by the [place]  Food and Drug Administration  The FDA has determined that such  clearance or approval is not necessary  These tests are used for clinical purposes  They should not be regarded as  investigational or for research  This laboratory has been approved by [place] 88, designated as a high-complexity laboratory and  is qualified to perform these tests  Interpretation performed at [facility], [address] [place] [address]  Gross Description  A  The specimen is received in formalin, labeled with the patient's name and hospital number, and is designated "left lateral base " The  specimen consists of a single tan soft tissue core measuring 1 5 cm in length and less than 0 1 cm in diameter  The specimen is  entirely submitted in 1 cassette  B  The specimen is received in formalin, labeled with the patient's name and hospital number, and is designated "left lateral mid " The  specimen consists of a single tan, focally friable soft tissue core measuring 0 8 cm in length and less than 0 1 cm in diameter  The  specimen is entirely submitted in 1 cassette    [id number]  Jultracei December  [id number]  Page: 2

## 2019-03-11 NOTE — PROGRESS NOTES
Assessment/Plan:      Diagnoses and all orders for this visit:    Prostate cancer ([facility] [address] )  -     ciprofloxacin (CIPRO) 500 mg tablet; Take 1 tablet (500 mg total) by mouth 2 (two) times a day for 2 days  -     cephalexin (KEFLEX) 500 mg capsule; Take 1 capsule (500 mg total) by mouth 2 (two) times a day for 2 days  -     oxyCODONE (ROXICODONE) 5 mg immediate release tablet; Take 1 tablet (5 mg total) by mouth once for 1 doseMax Daily Amount: 5 mg  -     ALPRAZolam (XANAX) 0 25 mg tablet; Take 2 tablets (0 5 mg total) by mouth once for 1 dose  -     Biopsy prostate; Future        Plan:  Patient agrees to active surveillance with a re-biopsy planned in 3 months  After careful review and summary of the medical history and all laboratory and radiographic studies as outlined above, I discussed with the patient and family members at length the finding of adenocarcinoma of the prostate  We discussed the patient's grade (Millport score 6), other parameters, and clinical stage based upon the above findings  I then discussed the options available to him at this time, based on his clinically localized prostate cancer  These include observation, radiation therapy, radical prostatectomy and cryosurgery  Regarding observation, they understand this is ideally suited for men with either clinically insignificant or slow growing cancer or for whom because of age or other concurrent medical conditions the risks of treatment are greater than the potential benefits of treatment  While this option avoids treatment-associated side effects, I explained it requires frequent evaluation in order to monitor for potential disease progression, and that despite vigilant follow-up there will be cases where the cancer spreads and is no longer potentially curable with local therapies   I reiterated that for many men, watchful waiting can be associated with increased anxiety and stress concerning their diagnosis, and that commonly patients only defer definitive treatment to a later date  I counseled him that based on his pathology, he was a good candidate for active surveillance  To further ensure that he has reasonably low-grade cancer moving forward I recommended a confirmatory prostate biopsy in 3 months to reassess the prior positive area  With regard to radiation therapy, I reviewed the options available  These include external beam radiation therapy versus brachytherapy  Regarding XRT, we discussed the differences in conventional versus 3D conformal external beam radiation therapy versus IMRT  The indications, risks and potential complications of each of these options as well as the rationale for using selective radiation options were reviewed in detail  We also discussed interstitial seed therapy and risk stratification as a determinant for optimal therapies  All questions were again answered  I did offer to refer the patient to the radiation oncologists directly in order to further clarify these issues  With regard to the surgical options, we reviewed the different options including a radical perineal prostatectomy versus the radical retropubic prostatectomy via open, laparoscopic and robotic approach  I explained the operations that I primarily perform, robotic-assisted laparoscopic radical prostatectomy, and compared it with the alternative that I am also experienced with, traditional open retropubic prostatectomy  I also mentioned the less commonly used but accepted perineal approach  The advantages and limitations of these various approaches were described in detail  The anticipated hospital and post-operative courses were reviewed  I highlighted the relevant anatomy, and we discussed the importance of neurovascular bundle preservation (nerve-sparing) to minimize negative effects on erectile function and continence   While in the majority of cases bilateral nerve-sparing is appropriate and possible, they understand that in certain circumstances intentional partial or complete unilateral or bilateral neurovascular bundle resection is necessary when there is suspicion that cancer extends into that region; this may be determined either preoperatively or intraoperatively with or without biopsy  The patient is clearly aware that he may still be rendered sexually impotent and incontinent as a consequence of the operative procedure  The possibility of stress-type urinary incontinence associated with either form of radical prostatectomy was also detailed for the patient  I explained that concurrent pelvic lymphadenectomy may be performed, for intermediate, or high risk clinical disease or suspicious intraoperative adenopathy, which serves both a diagnostic and potentially therapeutic purpose if metastatic disease is identified  We discussed the general risks of surgery, which include but are not limited to infection, bleeding, medical and anesthetic complications, and adjacent organ involvement or injury  With regard to cryosurgery, this is felt to be an experimental option at this time secondary to insufficient data to support its routine use in this clinical setting  Regarding hormonal therapy, we discussed it is primarily used as systemic therapy for patients with advanced or metastatic disease, but that it also has been shown to be beneficial in conjunction with radiation therapy for certain categories of patients  The patient understands that hormonal therapy alone is not considered curative treatment and will slow the growth of but not eliminate prostate cancer  The patient was advised to become an active participant in their care and to become proactive in their care  They were encouraged to call my office with any unanswered questions or seek additional medical opinions at their discretion   They understand that the decision as to which approach to take is one made based on the medical risks and benefits as well as their own informed tolerance of this risk  Subjective:  No complaints     Patient ID: Gaetano Dennis is a 66 y o  male  HPI  Patient recent found to have a follow-up screening PSA abnormal elevation of 6 8, with a percent free of 10%   Upon review of his prior records a PSA level obtained in April of 2017 was 4 0   The patient denies any prior urologic history, no history of UTI or voiding symptoms to report such as dysuria, hematuria, flank or abdominal pains   He states he has a good appetite and normal bowel function   He denies any weight loss, or pain in new locations  Review of Systems   Constitutional: Negative  HENT: Negative  Eyes: Negative  Respiratory: Negative  Cardiovascular: Negative  Gastrointestinal: Negative  Endocrine: Negative  Genitourinary: Negative  Musculoskeletal: Negative  Skin: Negative  Allergic/Immunologic: Negative  Neurological: Negative  Hematological: Negative  Psychiatric/Behavioral: Negative  Objective:     Physical Exam   Constitutional: He is oriented to person, place, and time  He appears well-developed and well-nourished  No distress  HENT:   Head: Normocephalic and atraumatic  Nose: Nose normal    Mouth/Throat: Oropharynx is clear and moist    Eyes: Pupils are equal, round, and reactive to light  Conjunctivae and EOM are normal  No scleral icterus  Neck: Normal range of motion  Neck supple  Cardiovascular: Normal rate, regular rhythm, normal heart sounds and intact distal pulses  No murmur heard  Pulmonary/Chest: Effort normal and breath sounds normal  No respiratory distress  He has no wheezes  He has no rales  Abdominal: Soft  Bowel sounds are normal  He exhibits no distension and no mass  There is no tenderness  Musculoskeletal: Normal range of motion  He exhibits no edema or tenderness  Lymphadenopathy:     He has no cervical adenopathy  Neurological: He is alert and oriented to person, place, and time   No cranial nerve deficit  Skin: Skin is warm and dry  No rash noted  No erythema  No pallor  Psychiatric: He has a normal mood and affect  His behavior is normal  Judgment and thought content normal    Nursing note and vitals reviewed  Final Diagnosis  A  Prostate, Left Lateral Base, Biopsy:  - Benign prostatic tissue  B  Prostate, Left Lateral Mid, Biopsy:  - Benign prostatic tissue  [id number]  Paco Gonzales  [id number]  Page: 1 of 3 Printed: 3/7/2019 9:08 AM  C  Prostate, Left Lateral [place], Biopsy:  - Benign prostatic tissue  D  Prostate, Left Base, Biopsy:  - Benign prostatic tissue with acute inflammation (prostatitis)  E  Prostate, Left Mid, Biopsy:  - Benign prostatic tissue  F  Prostate, Left [place], Biopsy:  - Prostatic adenocarcinoma, acinar pattern, Shevlin Score 3 + 3 = 6, Prognostic Grade Group I, continuously involving one of one  submitted core (1 mm in length, 5% of total tissue)  - PIN4 immunohistochemistry supports the diagnosis  G  Prostate, Right Lateral [place], Biopsy:  - Benign prostatic tissue  H  Prostate, Right Lateral Mid, Biopsy:  - Benign prostatic tissue  I  Prostate, Right Lateral [place], Biopsy:  - Benign prostatic tissue  J  Prostate, Right Base, Biopsy:  - Benign prostatic tissue  K  Prostate, Right Mid, Biopsy:  - Benign prostatic tissue  L  Prostate, Right [place], Biopsy:  - Benign prostatic tissue  Electronically signed by Ollie Lambert DO on 3/7/2019 at [facility]    Note  Intradepartmental consultation is in agreement (Arcelia Aguilar)  Additional Information  All controls performed with the immunohistochemical stains reported above reacted appropriately  These tests were  developed and their performance characteristics determined by [address] [place] [address] or [facility]  They may not be cleared or approved by the [place]  Food and Drug Administration  The FDA has determined that such  clearance or approval is not necessary   These tests are used for clinical purposes  They should not be regarded as  investigational or for research  This laboratory has been approved by CLIA 88, designated as a high-complexity laboratory and  is qualified to perform these tests  Interpretation performed at [address] [address]  Gross Description  A  The specimen is received in formalin, labeled with the patient's name and hospital number, and is designated "left lateral base " The  specimen consists of a single tan soft tissue core measuring 1 5 cm in length and less than 0 1 cm in diameter  The specimen is  entirely submitted in 1 cassette  B  The specimen is received in formalin, labeled with the patient's name and hospital number, and is designated "left lateral mid " The  specimen consists of a single tan, focally friable soft tissue core measuring 0 8 cm in length and less than 0 1 cm in diameter  The  specimen is entirely submitted in 1 cassette    [id number]  Jared Oliva  [id number]  Page: 2

## 2019-04-02 ENCOUNTER — DOCUMENTATION (OUTPATIENT)
Dept: UROLOGY | Facility: AMBULATORY SURGERY CENTER | Age: 59
End: 2019-04-02

## 2019-05-08 ENCOUNTER — APPOINTMENT (OUTPATIENT)
Dept: LAB | Facility: HOSPITAL | Age: 59
End: 2019-05-08
Payer: COMMERCIAL

## 2019-05-08 ENCOUNTER — TELEPHONE (OUTPATIENT)
Dept: UROLOGY | Facility: MEDICAL CENTER | Age: 59
End: 2019-05-08

## 2019-05-08 ENCOUNTER — TRANSCRIBE ORDERS (OUTPATIENT)
Dept: ADMINISTRATIVE | Facility: HOSPITAL | Age: 59
End: 2019-05-08

## 2019-05-08 DIAGNOSIS — K11.1 ENLARGED PAROTID GLAND: ICD-10-CM

## 2019-05-08 DIAGNOSIS — G62.9 NEUROPATHY: ICD-10-CM

## 2019-05-08 DIAGNOSIS — M15.0 PRIMARY GENERALIZED HYPERTROPHIC OSTEOARTHROSIS: ICD-10-CM

## 2019-05-08 DIAGNOSIS — K11.1 ENLARGED PAROTID GLAND: Primary | ICD-10-CM

## 2019-05-08 LAB
ALBUMIN SERPL BCP-MCNC: 4.2 G/DL (ref 3.5–5)
ALP SERPL-CCNC: 53 U/L (ref 46–116)
ALT SERPL W P-5'-P-CCNC: 28 U/L (ref 12–78)
ANION GAP SERPL CALCULATED.3IONS-SCNC: 9 MMOL/L (ref 4–13)
AST SERPL W P-5'-P-CCNC: 14 U/L (ref 5–45)
BILIRUB SERPL-MCNC: 0.31 MG/DL (ref 0.2–1)
BUN SERPL-MCNC: 18 MG/DL (ref 5–25)
CALCIUM SERPL-MCNC: 9.8 MG/DL (ref 8.3–10.1)
CHLORIDE SERPL-SCNC: 103 MMOL/L (ref 100–108)
CO2 SERPL-SCNC: 27 MMOL/L (ref 21–32)
CREAT SERPL-MCNC: 1.15 MG/DL (ref 0.6–1.3)
CRP SERPL QL: <3 MG/L
ERYTHROCYTE [SEDIMENTATION RATE] IN BLOOD: 8 MM/HOUR (ref 0–10)
GFR SERPL CREATININE-BSD FRML MDRD: 70 ML/MIN/1.73SQ M
GLUCOSE SERPL-MCNC: 100 MG/DL (ref 65–140)
POTASSIUM SERPL-SCNC: 4.7 MMOL/L (ref 3.5–5.3)
PROT SERPL-MCNC: 7.6 G/DL (ref 6.4–8.2)
SODIUM SERPL-SCNC: 139 MMOL/L (ref 136–145)
VIT B12 SERPL-MCNC: 704 PG/ML (ref 100–900)

## 2019-05-08 PROCEDURE — 80053 COMPREHEN METABOLIC PANEL: CPT

## 2019-05-08 PROCEDURE — 36415 COLL VENOUS BLD VENIPUNCTURE: CPT

## 2019-05-08 PROCEDURE — 85652 RBC SED RATE AUTOMATED: CPT

## 2019-05-08 PROCEDURE — 82607 VITAMIN B-12: CPT

## 2019-05-08 PROCEDURE — 86140 C-REACTIVE PROTEIN: CPT

## 2019-08-12 ENCOUNTER — APPOINTMENT (OUTPATIENT)
Dept: LAB | Facility: HOSPITAL | Age: 59
End: 2019-08-12
Payer: COMMERCIAL

## 2019-08-12 DIAGNOSIS — E55.9 VITAMIN D DEFICIENCY: ICD-10-CM

## 2019-08-12 DIAGNOSIS — R73.03 PREDIABETES: ICD-10-CM

## 2019-08-12 LAB
25(OH)D3 SERPL-MCNC: 26.7 NG/ML (ref 30–100)
ALBUMIN SERPL BCP-MCNC: 4 G/DL (ref 3.5–5)
ALP SERPL-CCNC: 49 U/L (ref 46–116)
ALT SERPL W P-5'-P-CCNC: 28 U/L (ref 12–78)
ANION GAP SERPL CALCULATED.3IONS-SCNC: 10 MMOL/L (ref 4–13)
AST SERPL W P-5'-P-CCNC: 17 U/L (ref 5–45)
BILIRUB SERPL-MCNC: 0.3 MG/DL (ref 0.2–1)
BUN SERPL-MCNC: 19 MG/DL (ref 5–25)
CALCIUM SERPL-MCNC: 9.3 MG/DL (ref 8.3–10.1)
CHLORIDE SERPL-SCNC: 103 MMOL/L (ref 100–108)
CO2 SERPL-SCNC: 25 MMOL/L (ref 21–32)
CREAT SERPL-MCNC: 1.17 MG/DL (ref 0.6–1.3)
EST. AVERAGE GLUCOSE BLD GHB EST-MCNC: 120 MG/DL
GFR SERPL CREATININE-BSD FRML MDRD: 69 ML/MIN/1.73SQ M
GLUCOSE P FAST SERPL-MCNC: 116 MG/DL (ref 65–99)
HBA1C MFR BLD: 5.8 % (ref 4.2–6.3)
POTASSIUM SERPL-SCNC: 4.1 MMOL/L (ref 3.5–5.3)
PROT SERPL-MCNC: 7.2 G/DL (ref 6.4–8.2)
SODIUM SERPL-SCNC: 138 MMOL/L (ref 136–145)

## 2019-08-12 PROCEDURE — 36415 COLL VENOUS BLD VENIPUNCTURE: CPT | Performed by: FAMILY MEDICINE

## 2019-08-12 PROCEDURE — 3044F HG A1C LEVEL LT 7.0%: CPT | Performed by: FAMILY MEDICINE

## 2019-08-12 PROCEDURE — 83036 HEMOGLOBIN GLYCOSYLATED A1C: CPT | Performed by: FAMILY MEDICINE

## 2019-08-12 PROCEDURE — 82306 VITAMIN D 25 HYDROXY: CPT

## 2019-08-12 PROCEDURE — 80053 COMPREHEN METABOLIC PANEL: CPT

## 2019-08-27 ENCOUNTER — OFFICE VISIT (OUTPATIENT)
Dept: FAMILY MEDICINE CLINIC | Facility: CLINIC | Age: 59
End: 2019-08-27
Payer: COMMERCIAL

## 2019-08-27 VITALS
HEART RATE: 80 BPM | OXYGEN SATURATION: 99 % | BODY MASS INDEX: 24.11 KG/M2 | DIASTOLIC BLOOD PRESSURE: 62 MMHG | SYSTOLIC BLOOD PRESSURE: 124 MMHG | HEIGHT: 70 IN | WEIGHT: 168.4 LBS

## 2019-08-27 DIAGNOSIS — Z13.220 SCREENING FOR HYPERLIPIDEMIA: ICD-10-CM

## 2019-08-27 DIAGNOSIS — K21.9 GASTROESOPHAGEAL REFLUX DISEASE WITHOUT ESOPHAGITIS: ICD-10-CM

## 2019-08-27 DIAGNOSIS — R73.03 PREDIABETES: ICD-10-CM

## 2019-08-27 DIAGNOSIS — E66.3 OVERWEIGHT (BMI 25.0-29.9): ICD-10-CM

## 2019-08-27 DIAGNOSIS — E55.9 VITAMIN D DEFICIENCY: Primary | ICD-10-CM

## 2019-08-27 PROCEDURE — 3008F BODY MASS INDEX DOCD: CPT | Performed by: FAMILY MEDICINE

## 2019-08-27 PROCEDURE — 1036F TOBACCO NON-USER: CPT | Performed by: FAMILY MEDICINE

## 2019-08-27 PROCEDURE — 99214 OFFICE O/P EST MOD 30 MIN: CPT | Performed by: FAMILY MEDICINE

## 2019-08-27 NOTE — PATIENT INSTRUCTIONS
Here for recheck and doing well and sees Urology as directed for hx of prostate cancer  Labs reviewed showing prediabetes which is better and needs Vitamin D3 5000 IU daily to help vitamin D deficiency  Recheck in 6 months with labs and check lipids and blood sugar as directed  Low sugar diet and low cholesterol diet with exercise

## 2019-08-27 NOTE — PROGRESS NOTES
Assessment/Plan:  Chief Complaint   Patient presents with    Follow-up     Here for 6 month follow up of chronic medical problems  Patient Instructions   Here for recheck and doing well and sees Urology as directed for hx of prostate cancer  Labs reviewed showing prediabetes which is better and needs Vitamin D3 5000 IU daily to help vitamin D deficiency  Recheck in 6 months with labs and check lipids and blood sugar as directed  Low sugar diet and low cholesterol diet with exercise  No problem-specific Assessment & Plan notes found for this encounter  Diagnoses and all orders for this visit:    Vitamin D deficiency    Prediabetes  -     Comprehensive metabolic panel; Future  -     Hemoglobin A1C  -     Comprehensive metabolic panel; Future  -     Hemoglobin A1C    Overweight (BMI 25 0-29  9)    Gastroesophageal reflux disease without esophagitis    Screening for hyperlipidemia  -     Comprehensive metabolic panel; Future  -     Lipid Panel with Direct LDL reflex; Future    Other orders  -     Misc Natural Products ([address] [place] [id number]); Take by mouth          Subjective:      Patient ID: Yana Marion is a 64 y o  male  Follow-up (Here for 6 month follow up of chronic medical problems  ) No cp or sob, or ha  Sees Urology as directed for his prostate cancer  The following portions of the patient's history were reviewed and updated as appropriate: allergies, current medications, past family history, past medical history, past social history, past surgical history and problem list     Review of Systems   Constitutional: Negative  HENT: Negative  Eyes: Negative  Respiratory: Negative  Cardiovascular: Negative  Gastrointestinal: Negative  Endocrine: Negative  Genitourinary: Negative  Musculoskeletal: Negative  Skin: Negative  Allergic/Immunologic: Negative  Neurological: Negative  Hematological: Negative  Psychiatric/Behavioral: Negative  Objective:      /62   Pulse 80   Ht 5' 9 5" (1 765 m)   Wt 76 4 kg (168 lb 6 4 oz)   SpO2 99%   BMI 24 51 kg/m²          Physical Exam   Constitutional: He is oriented to person, place, and time  He appears well-developed and well-nourished  HENT:   Head: Normocephalic and atraumatic  Right Ear: External ear normal    Left Ear: External ear normal    Nose: Nose normal    Mouth/Throat: Oropharynx is clear and moist    Eyes: Pupils are equal, round, and reactive to light  Conjunctivae and EOM are normal    Neck: Normal range of motion  Neck supple  Cardiovascular: Normal rate, regular rhythm, normal heart sounds and intact distal pulses  Pulmonary/Chest: Effort normal and breath sounds normal    Musculoskeletal: Normal range of motion  Neurological: He is alert and oriented to person, place, and time  He has normal reflexes  Skin: Skin is warm and dry  Psychiatric: He has a normal mood and affect   His behavior is normal

## 2019-09-23 DIAGNOSIS — C61 PROSTATE CANCER (HCC): ICD-10-CM

## 2019-09-23 RX ORDER — ALPRAZOLAM 0.5 MG/1
TABLET ORAL
Qty: 1 TABLET | Refills: 0 | Status: SHIPPED | OUTPATIENT
Start: 2019-09-23 | End: 2019-09-24 | Stop reason: ALTCHOICE

## 2019-09-23 RX ORDER — OXYCODONE HYDROCHLORIDE 5 MG/1
TABLET ORAL
Qty: 1 TABLET | Refills: 0 | Status: SHIPPED | OUTPATIENT
Start: 2019-09-23 | End: 2019-09-24 | Stop reason: ALTCHOICE

## 2019-09-23 NOTE — TELEPHONE ENCOUNTER
Patient has an upcoming prostate biopsy scheduled for TOMORROW 9/24/19 with Dr Lencho Mueller in the [facility] location  Both scripts too old and need to be reissued    Scripts for both Xanax 0 5mg and Roxicodone 5mg were queued and forwarded to the Advanced Practitioner covering the [facility] location for approval

## 2019-09-24 ENCOUNTER — PROCEDURE VISIT (OUTPATIENT)
Dept: UROLOGY | Facility: MEDICAL CENTER | Age: 59
End: 2019-09-24
Payer: COMMERCIAL

## 2019-09-24 VITALS — HEIGHT: 70 IN | BODY MASS INDEX: 23.91 KG/M2 | WEIGHT: 167 LBS

## 2019-09-24 DIAGNOSIS — Z08 ENCOUNTER FOR FOLLOW-UP SURVEILLANCE OF PROSTATE CANCER: ICD-10-CM

## 2019-09-24 DIAGNOSIS — C61 PROSTATE CANCER (HCC): Primary | ICD-10-CM

## 2019-09-24 DIAGNOSIS — Z85.46 ENCOUNTER FOR FOLLOW-UP SURVEILLANCE OF PROSTATE CANCER: ICD-10-CM

## 2019-09-24 LAB
SL AMB  POCT GLUCOSE, UA: NORMAL
SL AMB LEUKOCYTE ESTERASE,UA: NORMAL
SL AMB POCT BILIRUBIN,UA: NORMAL
SL AMB POCT BLOOD,UA: NORMAL
SL AMB POCT CLARITY,UA: CLEAR
SL AMB POCT COLOR,UA: YELLOW
SL AMB POCT KETONES,UA: NORMAL
SL AMB POCT NITRITE,UA: NORMAL
SL AMB POCT PH,UA: 5.5
SL AMB POCT SPECIFIC GRAVITY,UA: 1.02
SL AMB POCT URINE PROTEIN: NORMAL
SL AMB POCT UROBILINOGEN: 0.2

## 2019-09-24 PROCEDURE — 76942 ECHO GUIDE FOR BIOPSY: CPT | Performed by: UROLOGY

## 2019-09-24 PROCEDURE — 99214 OFFICE O/P EST MOD 30 MIN: CPT | Performed by: UROLOGY

## 2019-09-24 PROCEDURE — G0416 PROSTATE BIOPSY, ANY MTHD: HCPCS | Performed by: PATHOLOGY

## 2019-09-24 PROCEDURE — 81003 URINALYSIS AUTO W/O SCOPE: CPT | Performed by: UROLOGY

## 2019-09-24 PROCEDURE — 55700 PR BIOPSY OF PROSTATE,NEEDLE/PUNCH: CPT | Performed by: UROLOGY

## 2019-09-24 NOTE — PROGRESS NOTES
Assessment/Plan:      Diagnoses and all orders for this visit:    Prostate cancer ([facility] [address] )  -     POCT urine dip auto non-scope  -     Biopsy prostate  -     Tissue Exam    Encounter for follow-up surveillance of prostate cancer  -     Biopsy prostate          Subjective:  No complaints     Patient ID: Nichol Montana is a 64 y o  male  HPI  Patient recent found to have a follow-up screening PSA abnormal elevation of 6 8, with a percent free of 10%   Upon review of his prior records a PSA level obtained in April of 2017 was 4 0   A prostate biopsy performed 6 months ago demonstrated 1/12 core Rockham score 6 cancer in the left apex occupying about 5% of the total tissue in that core The patient denies any prior urologic history, no history of UTI or voiding symptoms to report such as dysuria, hematuria, flank or abdominal pains   He states he has a good appetite and normal bowel function   He denies any weight loss, or pain in new locations       I counseled him that based on his pathology, he was a good candidate for active surveillance  To further ensure that he has reasonably low-grade cancer moving forward I recommended a confirmatory prostate biopsy in 6 months to reassess the prior positive area  He is here for a surveillance biopsy today  Review of Systems   Constitutional: Negative  HENT: Negative  Eyes: Negative  Respiratory: Negative  Cardiovascular: Negative  Gastrointestinal: Negative  Endocrine: Negative  Genitourinary: Negative  Musculoskeletal: Negative  Skin: Negative  Allergic/Immunologic: Negative  Neurological: Negative  Hematological: Negative  Psychiatric/Behavioral: Negative  Objective:     Physical Exam   Constitutional: He is oriented to person, place, and time  He appears well-developed and well-nourished  No distress  HENT:   Head: Normocephalic and atraumatic     Nose: Nose normal    Mouth/Throat: Oropharynx is clear and moist    Eyes: Pupils are equal, round, and reactive to light  Conjunctivae and EOM are normal  No scleral icterus  Neck: Normal range of motion  Neck supple  Cardiovascular: Normal rate, regular rhythm, normal heart sounds and intact distal pulses  No murmur heard  Pulmonary/Chest: Effort normal and breath sounds normal  No respiratory distress  He has no wheezes  He has no rales  Abdominal: Soft  Bowel sounds are normal  He exhibits no distension and no mass  There is no tenderness  Musculoskeletal: Normal range of motion  He exhibits no edema or tenderness  Lymphadenopathy:     He has no cervical adenopathy  Neurological: He is alert and oriented to person, place, and time  No cranial nerve deficit  Skin: Skin is warm and dry  No rash noted  No erythema  No pallor  Psychiatric: He has a normal mood and affect  His behavior is normal  Judgment and thought content normal    Nursing note and vitals reviewed        Refer to prostate biopsy procedure note

## 2019-09-24 NOTE — LETTER
September 24, 2019     Taya Reyna DO  [address] [facility]  [address]     Patient: Yana Marion   YOB: 1960   Date of Visit: 9/24/2019       Dear Dr Shanelle Khan: Thank you for referring Yana Marion to me for evaluation  Below are my notes for this consultation  If you have questions, please do not hesitate to call me  I look forward to following your patient along with you  Sincerely,        Henny Yao MD        CC: No Recipients  Henny Yao MD  9/24/2019 11:57 AM  Incomplete  Assessment/Plan:      Diagnoses and all orders for this visit:    Prostate cancer ([facility] [address] )  -     POCT urine dip auto non-scope  -     Biopsy prostate    Encounter for follow-up surveillance of prostate cancer  -     Biopsy prostate          Subjective:  No complaints     Patient ID: Yana Marion is a 64 y o  male  HPI  Patient recent found to have a follow-up screening PSA abnormal elevation of 6 8, with a percent free of 10%   Upon review of his prior records a PSA level obtained in April of 2017 was 4 0   A prostate biopsy performed 6 months ago demonstrated 1/12 core Ángel score 6 cancer in the left apex occupying about 5% of the total tissue in that core The patient denies any prior urologic history, no history of UTI or voiding symptoms to report such as dysuria, hematuria, flank or abdominal pains   He states he has a good appetite and normal bowel function   He denies any weight loss, or pain in new locations       I counseled him that based on his pathology, he was a good candidate for active surveillance  To further ensure that he has reasonably low-grade cancer moving forward I recommended a confirmatory prostate biopsy in 6 months to reassess the prior positive area  He is here for a surveillance biopsy today  Review of Systems   Constitutional: Negative  HENT: Negative  Eyes: Negative  Respiratory: Negative  Cardiovascular: Negative  Gastrointestinal: Negative  Endocrine: Negative  Genitourinary: Negative  Musculoskeletal: Negative  Skin: Negative  Allergic/Immunologic: Negative  Neurological: Negative  Hematological: Negative  Psychiatric/Behavioral: Negative  Objective:     Physical Exam   Constitutional: He is oriented to person, place, and time  He appears well-developed and well-nourished  No distress  HENT:   Head: Normocephalic and atraumatic  Nose: Nose normal    Mouth/Throat: Oropharynx is clear and moist    Eyes: Pupils are equal, round, and reactive to light  Conjunctivae and EOM are normal  No scleral icterus  Neck: Normal range of motion  Neck supple  Cardiovascular: Normal rate, regular rhythm, normal heart sounds and intact distal pulses  No murmur heard  Pulmonary/Chest: Effort normal and breath sounds normal  No respiratory distress  He has no wheezes  He has no rales  Abdominal: Soft  Bowel sounds are normal  He exhibits no distension and no mass  There is no tenderness  Musculoskeletal: Normal range of motion  He exhibits no edema or tenderness  Lymphadenopathy:     He has no cervical adenopathy  Neurological: He is alert and oriented to person, place, and time  No cranial nerve deficit  Skin: Skin is warm and dry  No rash noted  No erythema  No pallor  Psychiatric: He has a normal mood and affect  His behavior is normal  Judgment and thought content normal    Nursing note and vitals reviewed        Refer to prostate biopsy procedure note    Nalini Broderick MD  9/24/2019 11:57 AM  Sign at close encounter        Biopsy prostate     Date/Time 9/24/2019 11:54 AM     Performed by  Nalini Broderick MD     Authorized by Nalini Broderick MD       Local anesthesia used: yes      Anesthesia: local infiltration     Anesthesia   Local anesthesia used: yes  Local Anesthetic: lidocaine 1% with epinephrine     Procedure Details   Patient Transportation: confirmed  Patient tolerance: Patient tolerated the procedure well with no immediate complications         Patient reports he self administered the usual instructed preprocedure antibiotic and prepped  He was placed in left decubitus position and following digital exam the transrectal ultrasound probe was inserted  Axial and sagittal views of the prostate were noted and studied  There was no obvious hyperechoic or hypoechoic nodule seen  Approximate volume of the prostate as measured was 28 cc  With a long spinal needle 1% lidocaine was then infiltrated into the neurovascular bundles bilaterally  Under direct ultrasound guidance, a spring-loaded biopsy gun was used to perform quadrant biopsies from each of the left apex and left lateral apex regions, then a few isolated additional cores were taken from the left mid, left base and right apical regions  The cores were sent for pathologic analysis  The ultrasound probe was removed, and he tolerated procedure well  He was instructed on the post biopsy protocol  Robles Novak MD  9/24/2019 11:54 AM  Sign at close encounter  Assessment/Plan:      Diagnoses and all orders for this visit:    Prostate cancer ([facility] [address] )  -     POCT urine dip auto non-scope    Encounter for follow-up surveillance of prostate cancer          Subjective:  No complaints     Patient ID: Sukumar aBrclay is a 64 y o  male      HPI  Patient recent found to have a follow-up screening PSA abnormal elevation of 6 8, with a percent free of 10%   Upon review of his prior records a PSA level obtained in April of 2017 was 4 0   A prostate biopsy performed 6 months ago demonstrated 1/12 core Ángel score 6 cancer in the left apex occupying about 5% of the total tissue in that core The patient denies any prior urologic history, no history of UTI or voiding symptoms to report such as dysuria, hematuria, flank or abdominal pains   He states he has a good appetite and normal bowel function   He denies any weight loss, or pain in new locations       I counseled him that based on his pathology, he was a good candidate for active surveillance  To further ensure that he has reasonably low-grade cancer moving forward I recommended a confirmatory prostate biopsy in 6 months to reassess the prior positive area  He is here for a surveillance biopsy today  Review of Systems   Constitutional: Negative  HENT: Negative  Eyes: Negative  Respiratory: Negative  Cardiovascular: Negative  Gastrointestinal: Negative  Endocrine: Negative  Genitourinary: Negative  Musculoskeletal: Negative  Skin: Negative  Allergic/Immunologic: Negative  Neurological: Negative  Hematological: Negative  Psychiatric/Behavioral: Negative  Objective:     Physical Exam   Constitutional: He is oriented to person, place, and time  He appears well-developed and well-nourished  No distress  HENT:   Head: Normocephalic and atraumatic  Nose: Nose normal    Mouth/Throat: Oropharynx is clear and moist    Eyes: Pupils are equal, round, and reactive to light  Conjunctivae and EOM are normal  No scleral icterus  Neck: Normal range of motion  Neck supple  Cardiovascular: Normal rate, regular rhythm, normal heart sounds and intact distal pulses  No murmur heard  Pulmonary/Chest: Effort normal and breath sounds normal  No respiratory distress  He has no wheezes  He has no rales  Abdominal: Soft  Bowel sounds are normal  He exhibits no distension and no mass  There is no tenderness  Musculoskeletal: Normal range of motion  He exhibits no edema or tenderness  Lymphadenopathy:     He has no cervical adenopathy  Neurological: He is alert and oriented to person, place, and time  No cranial nerve deficit  Skin: Skin is warm and dry  No rash noted  No erythema  No pallor  Psychiatric: He has a normal mood and affect  His behavior is normal  Judgment and thought content normal    Nursing note and vitals reviewed        Refer to prostate biopsy procedure note

## 2019-09-24 NOTE — PROGRESS NOTES
Biopsy prostate     Date/Time 9/24/2019 11:54 AM     Performed by  Teresa Godoy MD     Authorized by Teresa Godoy MD       Local anesthesia used: yes      Anesthesia: local infiltration     Anesthesia   Local anesthesia used: yes  Local Anesthetic: lidocaine 1% with epinephrine     Procedure Details   Patient Transportation: confirmed  Patient tolerance: Patient tolerated the procedure well with no immediate complications         Patient reports he self administered the usual instructed preprocedure antibiotic and prepped  He was placed in left decubitus position and following digital exam the transrectal ultrasound probe was inserted  Axial and sagittal views of the prostate were noted and studied  There was no obvious hyperechoic or hypoechoic nodule seen  Approximate volume of the prostate as measured was 28 cc  With a long spinal needle 1% lidocaine was then infiltrated into the neurovascular bundles bilaterally  Under direct ultrasound guidance, a spring-loaded biopsy gun was used to perform quadrant biopsies from each of the left apex and left lateral apex regions, then a few isolated additional cores were taken from the left mid, left base and right apical regions  The cores were sent for pathologic analysis  The ultrasound probe was removed, and he tolerated procedure well  He was instructed on the post biopsy protocol

## 2019-09-26 ENCOUNTER — TELEPHONE (OUTPATIENT)
Dept: UROLOGY | Facility: MEDICAL CENTER | Age: 59
End: 2019-09-26

## 2019-10-16 ENCOUNTER — OFFICE VISIT (OUTPATIENT)
Dept: UROLOGY | Facility: MEDICAL CENTER | Age: 59
End: 2019-10-16
Payer: COMMERCIAL

## 2019-10-16 VITALS
HEIGHT: 70 IN | HEART RATE: 85 BPM | WEIGHT: 165 LBS | BODY MASS INDEX: 23.62 KG/M2 | DIASTOLIC BLOOD PRESSURE: 70 MMHG | SYSTOLIC BLOOD PRESSURE: 110 MMHG

## 2019-10-16 DIAGNOSIS — Z08 ENCOUNTER FOR FOLLOW-UP SURVEILLANCE OF PROSTATE CANCER: ICD-10-CM

## 2019-10-16 DIAGNOSIS — Z85.46 ENCOUNTER FOR FOLLOW-UP SURVEILLANCE OF PROSTATE CANCER: ICD-10-CM

## 2019-10-16 DIAGNOSIS — C61 PROSTATE CANCER (HCC): Primary | ICD-10-CM

## 2019-10-16 PROCEDURE — 99214 OFFICE O/P EST MOD 30 MIN: CPT | Performed by: UROLOGY

## 2019-10-16 NOTE — PROGRESS NOTES
Assessment/Plan:      Diagnoses and all orders for this visit:    Prostate cancer ([facility] [address] )  -     PSA, total and free; Future    Encounter for follow-up surveillance of prostate cancer  -     PSA, total and free; Future        Plan:  I counseled him, that based on the favorable findings of his follow-up prostate biopsy pathology, he is a good candidate for continued active surveillance   To further ensure that he has no risk of any sort of prostate cancer progression in the future, we will continue the active surveillance protocol  I therefore recommended a repeat PSA in 6 months, and possibly a a multiparametric prostate MRI in 1 year, with a repeat biopsy if needed  Subjective:  No complaints     Patient ID: Medina Friday is a 64 y o  male  HPI  The patient was recently found on follow-up screening a elevated PSA of 6 8, with a percent free of 10%   Upon review of his prior records a PSA level obtained in April of 2017 was 4 0   A prostate biopsy performed in March of 2019, demonstrated 1/12 core with Brickeys score 6 cancer in the left apex occupying about 5% of the total tissue in that core  The patient denies any prior urologic history, no history of UTI or voiding symptoms to report such as dysuria, hematuria, flank or abdominal pains   He states he has a good appetite and normal bowel function   He denies any weight loss, or pain in new locations   He had a follow-up prostate biopsy 2 weeks ago which was negative for any further cancer        Review of Systems   Constitutional: Negative  HENT: Negative  Eyes: Negative  Respiratory: Negative  Cardiovascular: Negative  Gastrointestinal: Negative  Endocrine: Negative  Genitourinary: Negative  Musculoskeletal: Negative  Skin: Negative  Allergic/Immunologic: Negative  Neurological: Negative  Hematological: Negative  Psychiatric/Behavioral: Negative            Objective:     Physical Exam   Constitutional: He is oriented to person, place, and time  He appears well-developed and well-nourished  No distress  HENT:   Head: Normocephalic and atraumatic  Nose: Nose normal    Mouth/Throat: Oropharynx is clear and moist    Eyes: Pupils are equal, round, and reactive to light  Conjunctivae and EOM are normal  No scleral icterus  Neck: Normal range of motion  Neck supple  Cardiovascular: Normal rate, regular rhythm, normal heart sounds and intact distal pulses  No murmur heard  Pulmonary/Chest: Effort normal and breath sounds normal  No respiratory distress  He has no wheezes  He has no rales  Abdominal: Soft  Bowel sounds are normal  He exhibits no distension and no mass  There is no tenderness  Musculoskeletal: Normal range of motion  He exhibits no edema or tenderness  Lymphadenopathy:     He has no cervical adenopathy  Neurological: He is alert and oriented to person, place, and time  No cranial nerve deficit  Skin: Skin is warm and dry  No rash noted  No erythema  No pallor  Psychiatric: He has a normal mood and affect  His behavior is normal  Judgment and thought content normal    Nursing note and vitals reviewed        09/24/2019 1158                                      [facility] [place]                                                             Pathologist:           Constance Brandon MD                                                                 Specimens:   A) - Prostate, l lat apex x4                                                                         B) - Prostate, l apex x4                                                                             C) - Prostate, l base                                                                                D) - Prostate, l mid                                                                                 E) - Prostate, r apex                                                                                F) - Prostate, r mid                                                                       Final Diagnosis   A  Left lateral apex, prostate (core biopsy ×4): - Benign prostatic and fibromuscular tissue       B  Left apex, prostate (core biopsy ×4): - Benign prostatic tissue       C  Left base, prostate (biopsy): - Benign prostatic tissue       D  Left mid, prostate (biopsy): - Benign prostatic tissue       E  Right apex, prostate (biopsy):     - Small focus suggesting high-grade PIN      F  Right mid, prostate (biopsy): - Benign prostatic tissue

## 2020-02-24 ENCOUNTER — APPOINTMENT (OUTPATIENT)
Dept: LAB | Facility: HOSPITAL | Age: 60
End: 2020-02-24
Payer: COMMERCIAL

## 2020-02-24 DIAGNOSIS — Z13.220 SCREENING FOR HYPERLIPIDEMIA: ICD-10-CM

## 2020-02-24 DIAGNOSIS — R73.03 PREDIABETES: ICD-10-CM

## 2020-02-24 LAB
ALBUMIN SERPL BCP-MCNC: 4 G/DL (ref 3.5–5)
ALP SERPL-CCNC: 56 U/L (ref 46–116)
ALT SERPL W P-5'-P-CCNC: 25 U/L (ref 12–78)
ANION GAP SERPL CALCULATED.3IONS-SCNC: 9 MMOL/L (ref 4–13)
AST SERPL W P-5'-P-CCNC: 10 U/L (ref 5–45)
BILIRUB SERPL-MCNC: 0.43 MG/DL (ref 0.2–1)
BUN SERPL-MCNC: 16 MG/DL (ref 5–25)
CALCIUM SERPL-MCNC: 9.3 MG/DL (ref 8.3–10.1)
CHLORIDE SERPL-SCNC: 103 MMOL/L (ref 100–108)
CHOLEST SERPL-MCNC: 223 MG/DL (ref 50–200)
CO2 SERPL-SCNC: 27 MMOL/L (ref 21–32)
CREAT SERPL-MCNC: 1.1 MG/DL (ref 0.6–1.3)
EST. AVERAGE GLUCOSE BLD GHB EST-MCNC: 120 MG/DL
GFR SERPL CREATININE-BSD FRML MDRD: 74 ML/MIN/1.73SQ M
GLUCOSE P FAST SERPL-MCNC: 115 MG/DL (ref 65–99)
HBA1C MFR BLD: 5.8 %
HDLC SERPL-MCNC: 42 MG/DL
LDLC SERPL CALC-MCNC: 144 MG/DL (ref 0–100)
POTASSIUM SERPL-SCNC: 4.2 MMOL/L (ref 3.5–5.3)
PROT SERPL-MCNC: 7.4 G/DL (ref 6.4–8.2)
SODIUM SERPL-SCNC: 139 MMOL/L (ref 136–145)
TRIGL SERPL-MCNC: 187 MG/DL

## 2020-02-24 PROCEDURE — 36415 COLL VENOUS BLD VENIPUNCTURE: CPT

## 2020-02-24 PROCEDURE — 80061 LIPID PANEL: CPT

## 2020-02-24 PROCEDURE — 83036 HEMOGLOBIN GLYCOSYLATED A1C: CPT | Performed by: FAMILY MEDICINE

## 2020-02-24 PROCEDURE — 80053 COMPREHEN METABOLIC PANEL: CPT

## 2020-04-21 ENCOUNTER — TELEPHONE (OUTPATIENT)
Dept: UROLOGY | Facility: MEDICAL CENTER | Age: 60
End: 2020-04-21

## 2022-04-12 NOTE — PATIENT INSTRUCTIONS
Doing well, prediabetes improved to HGA1C of 6 1 from 6 3  Vitamin D deficiency is improved  Gerd stable, use med as directed for this, stay active and call if any problems  Get colon screening as directed  May take Vitamin D3 2000 IU daily and recheck in 6 months 
none

## 2023-10-04 ENCOUNTER — TELEPHONE (OUTPATIENT)
Age: 63
End: 2023-10-04